# Patient Record
Sex: MALE | Race: WHITE | Employment: FULL TIME | URBAN - METROPOLITAN AREA
[De-identification: names, ages, dates, MRNs, and addresses within clinical notes are randomized per-mention and may not be internally consistent; named-entity substitution may affect disease eponyms.]

---

## 2017-02-09 NOTE — PROGRESS NOTES
HPI:    Patient ID: Yousuf Yost is a 29year old male. HPI  Patient is here for follow-up of depression and ADD. He is doing well and has complaint of right knee pain off and on for the past month. No trauma.   Review of Systems  Except for the ab abdomen is soft, non-tender, non-distended, no hepatosplenomegaly, no abnormal aortic pulsation. PSYCH: Mood and affect are normal.  Judgement and insight are intact. No suicidal thoughts.    MS: There is mild tenderness on the collateral ligaments of the

## 2017-05-18 ENCOUNTER — TELEPHONE (OUTPATIENT)
Dept: FAMILY MEDICINE CLINIC | Facility: CLINIC | Age: 37
End: 2017-05-18

## 2017-06-10 NOTE — PROGRESS NOTES
HPI:    Patient ID: Hayley Zavaleta is a 29year old male. HPI  Patient is here for follow-up of depression and ADD. He also complains of having knee pain on the right side which persist off and on.   Dr. Tho Fitzpatrick, GI, esophageal dilatation, [facility]  Read Christa palpation, no wheezing, no rhonchi, no rales, air entry is adequate, no accessory respiratory muscle use, no chest asymmetry, normal excursion.   GI:  bowel sound positive in all four quadrants, abdomen is soft, non-tender, non-distended, no hepatosplenomeg

## 2017-06-12 ENCOUNTER — TELEPHONE (OUTPATIENT)
Dept: FAMILY MEDICINE CLINIC | Facility: CLINIC | Age: 37
End: 2017-06-12

## 2017-10-26 ENCOUNTER — OFFICE VISIT (OUTPATIENT)
Dept: FAMILY MEDICINE CLINIC | Facility: CLINIC | Age: 37
End: 2017-10-26

## 2017-10-26 VITALS
SYSTOLIC BLOOD PRESSURE: 120 MMHG | RESPIRATION RATE: 12 BRPM | HEART RATE: 96 BPM | OXYGEN SATURATION: 100 % | HEIGHT: 71 IN | TEMPERATURE: 98 F | WEIGHT: 232 LBS | BODY MASS INDEX: 32.48 KG/M2 | DIASTOLIC BLOOD PRESSURE: 78 MMHG

## 2017-10-26 DIAGNOSIS — E55.9 VITAMIN D DEFICIENCY: ICD-10-CM

## 2017-10-26 DIAGNOSIS — Z00.00 ROUTINE ADULT HEALTH MAINTENANCE: Primary | ICD-10-CM

## 2017-10-26 DIAGNOSIS — Z13.31 NEGATIVE DEPRESSION SCREENING: ICD-10-CM

## 2017-10-26 PROCEDURE — 90686 IIV4 VACC NO PRSV 0.5 ML IM: CPT | Performed by: FAMILY MEDICINE

## 2017-10-26 PROCEDURE — 99395 PREV VISIT EST AGE 18-39: CPT | Performed by: FAMILY MEDICINE

## 2017-10-26 PROCEDURE — 90471 IMMUNIZATION ADMIN: CPT | Performed by: FAMILY MEDICINE
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
## 2017-10-27 ENCOUNTER — MED REC SCAN ONLY (OUTPATIENT)
Dept: FAMILY MEDICINE CLINIC | Facility: CLINIC | Age: 37
End: 2017-10-27

## 2017-12-06 RX ORDER — SERTRALINE HYDROCHLORIDE 100 MG/1
TABLET, FILM COATED ORAL
Qty: 180 TABLET | Refills: 0 | Status: SHIPPED | OUTPATIENT
Start: 2017-12-06 | End: 2018-02-11

## 2017-12-06 NOTE — TELEPHONE ENCOUNTER
Disp Refills Start End    SERTRALINE  MG Oral Tab 180 tablet 0 12/6/2017     Sig: TAKE ONE TABLET BY MOUTH TWICE DAILY     E-Prescribing Status: Receipt confirmed by pharmacy (12/6/2017 11:12 AM CST)

## 2017-12-06 NOTE — TELEPHONE ENCOUNTER
Requested Medications   Sertraline HCl Oral Tablet 100 MG  Will file in chart as: SERTRALINE  MG Oral Tab  The source prescription was discontinued on 10/26/2017 by Mariza Nichols, [address] [place] [address] Ave.   TAKE ONE TABLET BY MOUTH TWICE DAILY        Disp: 180 tablet

## 2017-12-21 ENCOUNTER — TELEPHONE (OUTPATIENT)
Dept: FAMILY MEDICINE CLINIC | Facility: CLINIC | Age: 37
End: 2017-12-21

## 2017-12-21 DIAGNOSIS — Z12.5 SCREENING FOR PROSTATE CANCER: Primary | ICD-10-CM

## 2017-12-21 NOTE — TELEPHONE ENCOUNTER
Dr Ole Murcia is calling requesting for PSA lab to be added to his lab orders and would like a call back when this is ordered.  Patient would like to have all his labs drawn at once    Lab order pended

## 2017-12-27 RX ORDER — ERGOCALCIFEROL 1.25 MG/1
50000 CAPSULE ORAL WEEKLY
Qty: 12 CAPSULE | Refills: 0 | Status: SHIPPED | OUTPATIENT
Start: 2017-12-27 | End: 2018-01-26

## 2017-12-27 NOTE — PROGRESS NOTES
Platelet count is low, looks like this was a problem in the past and pt has seen Dr. Eileen Zamorano, recommend a follow-up with Dr. Susu Hall, begin 50,000 U weekly for 12 weeks, and then OTC 2,000 daily  Elevated triglycerides, pt should work on die

## 2018-02-12 RX ORDER — SERTRALINE HYDROCHLORIDE 100 MG/1
TABLET, FILM COATED ORAL
Qty: 180 TABLET | Refills: 0 | Status: SHIPPED | OUTPATIENT
Start: 2018-02-12 | End: 2018-04-06

## 2018-02-12 NOTE — TELEPHONE ENCOUNTER
Requested Medications   Sertraline HCl Oral Tablet 100 MG  Will file in chart as: SERTRALINE  MG Oral Tab  TAKE ONE TABLET BY MOUTH TWICE DAILY        Disp: 180 tablet (Pharmacy requested 180)   Refills: 0     Class: Normal Start: 2/11/2018   Origin

## 2018-02-26 NOTE — TELEPHONE ENCOUNTER
Requesting Vyvanse 30 mg  LOV: 10/26/17  RTC: 6 mos  Last Labs: n/a  Filled: 12/25/17 #30 with 0 refills    No future appointments.     Med Last discussed: 10/26/17    Dispensed Written Strength Form Quantity Refills Days Supply Provider Pharmacy    [facility].

## 2018-02-27 NOTE — TELEPHONE ENCOUNTER
Pt called requesting brand name Vyvanse and not generic lisdexamfetamine. New script printed and awaiting Dr. Beka Liriano. Pt reported he will pick it up in a \"couple of days. \"  Requesting Vyvanse  LOV: 10/26/17  RTC: 6 months  Last Relevant Labs: n/a  F

## 2018-02-28 NOTE — TELEPHONE ENCOUNTER
I called patient to clarify as the new prescription was placed as generic also. I asked him why he wanted brand since he always gets generic - he said he thought he always got brand. I informed him he does not.   If he wants brand it will likely need prior

## 2018-04-06 ENCOUNTER — OFFICE VISIT (OUTPATIENT)
Dept: FAMILY MEDICINE CLINIC | Facility: CLINIC | Age: 38
End: 2018-04-06

## 2018-04-06 VITALS
TEMPERATURE: 98 F | RESPIRATION RATE: 16 BRPM | DIASTOLIC BLOOD PRESSURE: 72 MMHG | BODY MASS INDEX: 32.9 KG/M2 | SYSTOLIC BLOOD PRESSURE: 122 MMHG | HEIGHT: 71 IN | HEART RATE: 90 BPM | WEIGHT: 235 LBS | OXYGEN SATURATION: 99 %

## 2018-04-06 DIAGNOSIS — F98.8 ATTENTION DEFICIT DISORDER (ADD) WITHOUT HYPERACTIVITY: ICD-10-CM

## 2018-04-06 DIAGNOSIS — K62.5 RECTAL BLEEDING: Primary | ICD-10-CM

## 2018-04-06 PROCEDURE — 99214 OFFICE O/P EST MOD 30 MIN: CPT | Performed by: FAMILY MEDICINE

## 2018-04-09 NOTE — PROGRESS NOTES
HPI:    Patient ID: Velia Maza is a 40year old male. HPI  Patient presents with:  Medication Request: Vyvanse  Dizziness: with bodyaches and chills.  started last night and nauseous feeling, took zofran  Hemorrhoids: internal hemorrhoids, pt went auscultation, no chest tenderness to palpation, no wheezing, no rhonchi, no rales, air entry is adequate, no accessory respiratory muscle use, no chest asymmetry, normal excursion.   GI:  bowel sound positive in all four quadrants, abdomen is soft, non-tend

## 2018-04-30 RX ORDER — SERTRALINE HYDROCHLORIDE 100 MG/1
TABLET, FILM COATED ORAL
Qty: 180 TABLET | Refills: 0 | Status: SHIPPED | OUTPATIENT
Start: 2018-04-30 | End: 2018-09-30

## 2018-05-11 ENCOUNTER — OFFICE VISIT (OUTPATIENT)
Dept: HEMATOLOGY/ONCOLOGY | Facility: HOSPITAL | Age: 38
End: 2018-05-11
Attending: INTERNAL MEDICINE
Payer: COMMERCIAL

## 2018-05-11 VITALS
BODY MASS INDEX: 34.22 KG/M2 | HEIGHT: 70 IN | SYSTOLIC BLOOD PRESSURE: 131 MMHG | RESPIRATION RATE: 18 BRPM | DIASTOLIC BLOOD PRESSURE: 92 MMHG | TEMPERATURE: 97 F | HEART RATE: 84 BPM | WEIGHT: 239 LBS

## 2018-05-11 DIAGNOSIS — R16.1 SPLENOMEGALY: ICD-10-CM

## 2018-05-11 DIAGNOSIS — D68.0 VON WILLEBRAND DISEASE (HCC): ICD-10-CM

## 2018-05-11 DIAGNOSIS — D69.6 THROMBOCYTOPENIA (HCC): Primary | ICD-10-CM

## 2018-05-11 PROCEDURE — 99245 OFF/OP CONSLTJ NEW/EST HI 55: CPT | Performed by: INTERNAL MEDICINE

## 2018-05-16 NOTE — CONSULTS
[facility] Report of Consultation    Patient Name: Coar Roberts   YOB: 1980   Medical Record Number: [id number]   CSN: [id number]   Consulting Physician: Adriana Sepulveda M.D.    Referring Physician: Roger Flores    Date of Consultat Seat Belt Yes     Social History Narrative   None on file       Current Medications:    Current Outpatient Prescriptions:   •  SERTRALINE  MG Oral Tab, TAKE ONE TABLET BY MOUTH TWICE DAILY , Disp: 180 tablet, Rfl: 0  •  Lisdexamfetamine Dimesylat inflammation, ulcerations or skin changes. Neurologic Normal - No headache, blurred vision, and no areas of focal weakness. Normal gait. Psychiatric Normal - No insomnia, depression, yonatan or mood swings.          Vital Signs:  /92 (BP Location: L 11/8/2013 12:30 10/9/2015 11:26 5/19/2017 09:39 12/26/2017 08:57 5/11/2018 14:53   WBC Latest Ref Range: 4.0 - 13.0 x10(3) uL 5.3 4.6   6.1   Hemoglobin Latest Ref Range: 13.0 - 17.0 g/dL 16.0 15.2 15.2 15.4 15.6   Hematocrit Latest Ref Range: 37.0 - 53.0 related to his Fatty liver. We discussed the fact that the platelet count has been low for some time without progression or the appearance of an other count abnormalities. This is a benign finding. Will repeat imaging of the spleen.   If the spleen is no

## 2018-07-16 RX ORDER — LISDEXAMFETAMINE DIMESYLATE 30 MG/1
CAPSULE ORAL
Qty: 30 CAPSULE | Refills: 0 | OUTPATIENT
Start: 2018-07-16

## 2018-07-16 NOTE — TELEPHONE ENCOUNTER
Requested Medications   Vyvanse Oral Capsule 30 MG  Will file in chart as: VYVANSE 30 MG Oral Cap  TAKE ONE CAPSULE BY MOUTH ONE TIME DAILY        Disp: 30 capsule (Pharmacy requested 30)   Refills: 0     Class: Normal Start: 7/15/2018   Documented:3 years

## 2018-10-03 RX ORDER — SERTRALINE HYDROCHLORIDE 100 MG/1
100 TABLET, FILM COATED ORAL 2 TIMES DAILY
Qty: 60 TABLET | Refills: 0 | Status: SHIPPED | OUTPATIENT
Start: 2018-10-03 | End: 2018-11-13

## 2018-11-02 ENCOUNTER — OFFICE VISIT (OUTPATIENT)
Dept: FAMILY MEDICINE CLINIC | Facility: CLINIC | Age: 38
End: 2018-11-02
Payer: COMMERCIAL

## 2018-11-02 VITALS
OXYGEN SATURATION: 100 % | BODY MASS INDEX: 34.07 KG/M2 | HEIGHT: 70 IN | DIASTOLIC BLOOD PRESSURE: 70 MMHG | RESPIRATION RATE: 12 BRPM | SYSTOLIC BLOOD PRESSURE: 120 MMHG | WEIGHT: 238 LBS | TEMPERATURE: 98 F | HEART RATE: 81 BPM

## 2018-11-02 DIAGNOSIS — F98.8 ATTENTION DEFICIT DISORDER (ADD) WITHOUT HYPERACTIVITY: ICD-10-CM

## 2018-11-02 DIAGNOSIS — R31.9 HEMATURIA, UNSPECIFIED TYPE: ICD-10-CM

## 2018-11-02 DIAGNOSIS — Z00.00 ROUTINE ADULT HEALTH MAINTENANCE: Primary | ICD-10-CM

## 2018-11-02 DIAGNOSIS — R73.9 ELEVATED BLOOD SUGAR: ICD-10-CM

## 2018-11-02 DIAGNOSIS — Z13.6 ISCHEMIC HEART DISEASE SCREEN: ICD-10-CM

## 2018-11-02 DIAGNOSIS — R53.83 FATIGUE, UNSPECIFIED TYPE: ICD-10-CM

## 2018-11-02 PROCEDURE — 99213 OFFICE O/P EST LOW 20 MIN: CPT | Performed by: FAMILY MEDICINE

## 2018-11-02 PROCEDURE — 99395 PREV VISIT EST AGE 18-39: CPT | Performed by: FAMILY MEDICINE

## 2018-11-02 RX ORDER — LOSARTAN POTASSIUM 50 MG/1
50 TABLET ORAL DAILY
Qty: 90 TABLET | Refills: 0 | Status: CANCELLED | OUTPATIENT
Start: 2018-11-02

## 2018-11-02 NOTE — PROGRESS NOTES
Shameka Garcia is a 41year old male who presents for a complete physical exam.   HPI:   Patient would also like to let me know that he has knee pain in the right side and has been trying to manage that with anti-inflammatories and stretching.   It has help mouth daily. Disp: 30 capsule Rfl: 0   [START ON 12/2/2018] Lisdexamfetamine Dimesylate (VYVANSE) 30 MG Oral Cap Take 1 capsule (30 mg total) by mouth daily.  Disp: 30 capsule Rfl: 0   [START ON 1/1/2019] Lisdexamfetamine Dimesylate (VYVANSE) 30 MG Oral Cap denies scrotal mass/abnormal discharge from urethra  MUSCULOSKELETAL: denies joint or muscle aches or pains  NEURO: denies headaches/dizziness  PSYCH: denies depression or anxiety  HEMATOLOGIC: denies easy bruising/bleeding/anemia/blood clot disorders  END Yrs+ Quad Prsv Free 0.5 ml (46380)                          10/21/2016  10/26/2017      ASSESSMENT AND PLAN:   Kristofer Gallardo is a 41year old male who presents for a complete physical exam.     David Parth was seen today for cpx.     Diagnoses and all orders f understanding of these recommendations and agrees to the plan. We will check testosterone level along with vitamin D and blood test regarding fatigue. We will also order urinalysis to see if blood is still there. If it is he may need further workup.

## 2018-11-13 RX ORDER — SERTRALINE HYDROCHLORIDE 100 MG/1
100 TABLET, FILM COATED ORAL 2 TIMES DAILY
Qty: 60 TABLET | Refills: 0 | Status: SHIPPED | OUTPATIENT
Start: 2018-11-13 | End: 2018-12-16

## 2018-12-18 RX ORDER — SERTRALINE HYDROCHLORIDE 100 MG/1
100 TABLET, FILM COATED ORAL 2 TIMES DAILY
Qty: 180 TABLET | Refills: 2 | Status: SHIPPED | OUTPATIENT
Start: 2018-12-18 | End: 2019-10-02

## 2019-01-14 NOTE — TELEPHONE ENCOUNTER
Requesting vyvanse 30 mg  LOV: 11/2/18  RTC: none specified   Last Labs: n/a  Filled: 1/1/19 #30 with 0 refills    No future appointments.   [place] Written Strength Quantity Refills Days Supply Provider Pharmacy    [facility] 11/25/2018 11/02/2018 3

## 2019-04-16 RX ORDER — LISDEXAMFETAMINE DIMESYLATE 30 MG/1
CAPSULE ORAL
Qty: 30 CAPSULE | Refills: 0 | Status: SHIPPED | OUTPATIENT
Start: 2019-04-16 | End: 2019-11-15

## 2019-04-16 RX ORDER — LISDEXAMFETAMINE DIMESYLATE 30 MG/1
CAPSULE ORAL
Qty: 30 CAPSULE | Refills: 0 | OUTPATIENT
Start: 2019-04-16

## 2019-04-16 NOTE — TELEPHONE ENCOUNTER
Faxed RX for Vyvanse signed per Dr. Tavo Duncan to [facility] [phone] with confirmation received.

## 2019-04-22 NOTE — TELEPHONE ENCOUNTER
Script for vyvanse cancelled. Patient unable to  until - script will . Patient scheduled his 6 month med follow up on Friday so that he can get a 3 month supply instead of 1.   Future Appointments   Date Time Provider Department Cent

## 2019-05-01 ENCOUNTER — MED REC SCAN ONLY (OUTPATIENT)
Dept: FAMILY MEDICINE CLINIC | Facility: CLINIC | Age: 39
End: 2019-05-01

## 2019-05-03 PROBLEM — E66.09 CLASS 2 OBESITY DUE TO EXCESS CALORIES WITHOUT SERIOUS COMORBIDITY WITH BODY MASS INDEX (BMI) OF 35.0 TO 35.9 IN ADULT: Status: ACTIVE | Noted: 2019-05-03

## 2019-05-03 PROBLEM — E55.9 VITAMIN D DEFICIENCY: Status: ACTIVE | Noted: 2019-05-03

## 2019-05-03 PROBLEM — E66.812 CLASS 2 OBESITY DUE TO EXCESS CALORIES WITHOUT SERIOUS COMORBIDITY WITH BODY MASS INDEX (BMI) OF 35.0 TO 35.9 IN ADULT: Status: ACTIVE | Noted: 2019-05-03

## 2019-05-03 NOTE — PROGRESS NOTES
HPI:   Gilberto Pena is a 41year old male that presents for medication refills    Patient is here for 3 month follow up on vyvanse 30mg    Past medical, surgical, family and social history reviewed in detail with patient. Updated where appropriate. Height as of this encounter: 70\". Weight as of this encounter: 247 lb. Vital signs reviewed. Appears stated age, well groomed. Physical Exam:  GEN:  Patient is alert, awake and oriented, well developed, well nourished, no apparent distress.   HEENT: file.    Shana Ardon M.D.   [facility]   5/3/2019  9:49 AM

## 2019-08-21 ENCOUNTER — TELEPHONE (OUTPATIENT)
Dept: FAMILY MEDICINE CLINIC | Facility: CLINIC | Age: 39
End: 2019-08-21

## 2019-08-21 NOTE — TELEPHONE ENCOUNTER
Pt is requesting 3 month refill Vyvanse 30mg    [place] , [place] 7/14/19 #30      Last OV 5/3/19  1. Attention deficit disorder (ADD) without hyperactivity  Will refill Vyvanse counseled at length regarding ADD. No side effects.    He may follow-u

## 2019-08-21 NOTE — TELEPHONE ENCOUNTER
- Pt would like to request refill for   VYVANSE 30 MG Oral Cap 30 capsule 0 4/16/2019     Sig: TAKE ONE CAPSULE (30 MG TOTAL) BY MOUTH ONE TIME DAILY     Earliest Fill Date: 4/16/2019      Please advise if patient needs an appt for refills?     Ple

## 2019-10-02 RX ORDER — SERTRALINE HYDROCHLORIDE 100 MG/1
100 TABLET, FILM COATED ORAL 2 TIMES DAILY
Qty: 60 TABLET | Refills: 1 | Status: SHIPPED | OUTPATIENT
Start: 2019-10-02 | End: 2019-11-22

## 2019-10-02 NOTE — TELEPHONE ENCOUNTER
Requesting Sertraline 100mg bid  LOV: 5/3/19  RTC: 6 months  Last Relevant Labs: annual labs done 11/23/18  Filled: 12/18/18 #180 with 2 refills    No future appointments.     Non protocol med pended and routed to MD for approval/denial

## 2019-11-15 ENCOUNTER — OFFICE VISIT (OUTPATIENT)
Dept: FAMILY MEDICINE CLINIC | Facility: CLINIC | Age: 39
End: 2019-11-15
Payer: COMMERCIAL

## 2019-11-15 VITALS
WEIGHT: 246 LBS | SYSTOLIC BLOOD PRESSURE: 121 MMHG | HEIGHT: 70 IN | BODY MASS INDEX: 35.22 KG/M2 | RESPIRATION RATE: 16 BRPM | DIASTOLIC BLOOD PRESSURE: 80 MMHG | TEMPERATURE: 98 F | HEART RATE: 75 BPM

## 2019-11-15 DIAGNOSIS — E78.00 HIGH CHOLESTEROL: ICD-10-CM

## 2019-11-15 DIAGNOSIS — F98.8 ATTENTION DEFICIT DISORDER (ADD) WITHOUT HYPERACTIVITY: ICD-10-CM

## 2019-11-15 DIAGNOSIS — Z00.00 ROUTINE GENERAL MEDICAL EXAMINATION AT A HEALTH CARE FACILITY: Primary | ICD-10-CM

## 2019-11-15 PROCEDURE — 99395 PREV VISIT EST AGE 18-39: CPT | Performed by: FAMILY MEDICINE

## 2019-11-15 RX ORDER — FLUTICASONE PROPIONATE 220 UG/1
AEROSOL, METERED RESPIRATORY (INHALATION)
Refills: 1 | COMMUNITY
Start: 2019-09-22 | End: 2022-01-16
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
## 2019-11-26 RX ORDER — SERTRALINE HYDROCHLORIDE 100 MG/1
100 TABLET, FILM COATED ORAL 2 TIMES DAILY
Qty: 60 TABLET | Refills: 5 | Status: SHIPPED | OUTPATIENT
Start: 2019-11-26 | End: 2020-05-22

## 2019-12-04 RX ORDER — LISDEXAMFETAMINE DIMESYLATE CAPSULES 30 MG/1
30 CAPSULE ORAL DAILY
Qty: 30 CAPSULE | Refills: 0 | Status: SHIPPED | OUTPATIENT
Start: 2019-12-04 | End: 2020-01-03

## 2019-12-04 RX ORDER — LISDEXAMFETAMINE DIMESYLATE 30 MG/1
CAPSULE ORAL
Qty: 30 CAPSULE | Refills: 0
Start: 2019-12-04

## 2019-12-04 RX ORDER — LISDEXAMFETAMINE DIMESYLATE CAPSULES 30 MG/1
30 CAPSULE ORAL DAILY
Qty: 30 CAPSULE | Refills: 0 | Status: SHIPPED | OUTPATIENT
Start: 2020-01-04 | End: 2020-02-03

## 2019-12-04 RX ORDER — LISDEXAMFETAMINE DIMESYLATE CAPSULES 30 MG/1
30 CAPSULE ORAL DAILY
Qty: 30 CAPSULE | Refills: 0 | Status: SHIPPED | OUTPATIENT
Start: 2020-02-04 | End: 2020-03-05

## 2019-12-04 NOTE — TELEPHONE ENCOUNTER
Patient is requesting a refill on Vyvanse 30mg #30  Was here for ADHD  In May 2019, was to return in 6 months  Was here in Nov but nothing discussed about ADHD  Med pended

## 2020-01-04 NOTE — TELEPHONE ENCOUNTER
Requesting Sertraline  LOV: 4/6/18  RTC: 6 months  Last Relevant Labs: n/a  Filled: 4/30/18 #180 with 0 refills    No future appointments. Pt is due for office visit.  One month sent to pharmacy and my chart sent to schedule Never smoker

## 2020-03-10 RX ORDER — LISDEXAMFETAMINE DIMESYLATE CAPSULES 30 MG/1
30 CAPSULE ORAL DAILY
Qty: 30 CAPSULE | Refills: 0 | Status: SHIPPED | OUTPATIENT
Start: 2020-04-10 | End: 2020-06-22

## 2020-03-10 RX ORDER — LISDEXAMFETAMINE DIMESYLATE CAPSULES 30 MG/1
30 CAPSULE ORAL DAILY
Qty: 30 CAPSULE | Refills: 0 | Status: SHIPPED | OUTPATIENT
Start: 2020-05-11 | End: 2020-06-10

## 2020-03-10 RX ORDER — LISDEXAMFETAMINE DIMESYLATE 30 MG/1
CAPSULE ORAL
Qty: 30 CAPSULE | Refills: 0 | OUTPATIENT
Start: 2020-03-10

## 2020-03-10 RX ORDER — LISDEXAMFETAMINE DIMESYLATE CAPSULES 30 MG/1
30 CAPSULE ORAL DAILY
Qty: 30 CAPSULE | Refills: 0 | Status: SHIPPED | OUTPATIENT
Start: 2020-03-10 | End: 2020-04-09

## 2020-03-10 NOTE — TELEPHONE ENCOUNTER
Requesting Vyvanse 30mg  LOV: 11/15/19 Physical  RTC: not specified  Last Relevant Labs: 11/23/18  Filled: 2/4/2020 #30 with 0 refills    No future appointments.     Rx pended and routed to MD for approval/denial

## 2020-05-22 RX ORDER — SERTRALINE HYDROCHLORIDE 100 MG/1
100 TABLET, FILM COATED ORAL 2 TIMES DAILY
Qty: 180 TABLET | Refills: 0 | Status: SHIPPED | OUTPATIENT
Start: 2020-05-22 | End: 2020-08-13

## 2020-05-22 NOTE — TELEPHONE ENCOUNTER
Requested Prescriptions     Pending Prescriptions Disp Refills   • SERTRALINE  MG Oral Tab [Pharmacy Med Name: Sertraline Hydrochloride 100 Mg Tab Nort] 60 tablet 0     Sig: Take 1 tablet (100 mg total) by mouth 2 (two) times daily.        LOV: 11/15

## 2020-06-22 RX ORDER — LISDEXAMFETAMINE DIMESYLATE 30 MG/1
CAPSULE ORAL
Qty: 30 CAPSULE | Refills: 0 | Status: SHIPPED | OUTPATIENT
Start: 2020-06-22 | End: 2020-08-03

## 2020-06-22 NOTE — TELEPHONE ENCOUNTER
Requesting Vyvanse 30mg  LOV: 11/23/19 Physical  RTC: prn  Last Relevant Labs: annual labs done 11/23/18  Filled: 5/11/2020 #30 with 0 refills    No future appointments. Rx pended and routed for approval/denial. Do you want pt to schedule follow up?

## 2020-08-03 RX ORDER — LISDEXAMFETAMINE DIMESYLATE 30 MG/1
CAPSULE ORAL
Qty: 30 CAPSULE | Refills: 0 | Status: SHIPPED | OUTPATIENT
Start: 2020-08-03 | End: 2020-09-21

## 2020-08-03 NOTE — TELEPHONE ENCOUNTER
Name from pharmacy: [facility] [address]          Will file in chart as: VYVANSE 30 MG Oral Cap         Sig: TAKE ONE CAPSULE BY MOUTH ONE TIME DAILY     Disp:  30 capsule    Refills:  0    Start: 8/2/2020    Earliest Fill Date: 8/2/2020    Class: Normal

## 2020-08-12 NOTE — TELEPHONE ENCOUNTER
Requested Prescriptions     Pending Prescriptions Disp Refills   • SERTRALINE  MG Oral Tab [Pharmacy Med Name: Sertraline Hydrochloride 100 Mg Tab Nort] 180 tablet 0     Sig: Take 1 tablet (100 mg total) by mouth 2 (two) times daily.        LOV: 11/1

## 2020-08-13 RX ORDER — SERTRALINE HYDROCHLORIDE 100 MG/1
100 TABLET, FILM COATED ORAL 2 TIMES DAILY
Qty: 180 TABLET | Refills: 0 | Status: SHIPPED | OUTPATIENT
Start: 2020-08-13 | End: 2020-11-06

## 2020-09-21 ENCOUNTER — PATIENT MESSAGE (OUTPATIENT)
Dept: FAMILY MEDICINE CLINIC | Facility: CLINIC | Age: 40
End: 2020-09-21

## 2020-09-21 RX ORDER — LISDEXAMFETAMINE DIMESYLATE 30 MG/1
CAPSULE ORAL
Qty: 30 CAPSULE | Refills: 0 | OUTPATIENT
Start: 2020-09-21

## 2020-09-21 NOTE — TELEPHONE ENCOUNTER
Name from pharmacy: [facility]          Will file in chart as: VYVANSE 30 MG Oral Cap    The original prescription was reordered on 9/21/2020 by Melisa Armenta MD. Renewing this prescription may not be appropriate.      Possible duplicate: Cash

## 2020-09-21 NOTE — TELEPHONE ENCOUNTER
Received a fax from [facility] requesting a refill for Rx Vyvanse #30   Last OV:11/15/2019  No protocol medication   Last time medication was refilled:8/3/20#30 with no refill   Medication routed to PCP for refill if appropriate.    No future appointment

## 2020-09-22 NOTE — TELEPHONE ENCOUNTER
Spoke with pharmacy and prescription was received yesterday, will be filled and available for  today.

## 2020-09-22 NOTE — TELEPHONE ENCOUNTER
From: Mahendra Perales  To: June Basilio MD  Sent: 9/21/2020 5:32 PM CDT  Subject: Prescription Question    Sorry, for the confusion. Was the Vyvanse approved or denied?

## 2020-10-29 NOTE — TELEPHONE ENCOUNTER
Disp Refills Start End    Lisdexamfetamine Dimesylate (VYVANSE) 30 MG Oral Cap 30 capsule 0 9/21/2020       Last OV 11/15/19 for physical   Future Appointments   Date Time Provider Alli Felipe   11/16/2020  9:30 AM Mariza Simmons MD [facility]

## 2020-10-29 NOTE — TELEPHONE ENCOUNTER
Future Appointments   Date Time Provider Alli Felipe   11/16/2020  9:30 AM Geoffrey Solano MD [facility] [address]     Pt is asking for labs to be put in system prior to his appt.     Please let pt know when he can schedule.  _________________________

## 2020-11-06 RX ORDER — SERTRALINE HYDROCHLORIDE 100 MG/1
100 TABLET, FILM COATED ORAL 2 TIMES DAILY
Qty: 180 TABLET | Refills: 0 | Status: SHIPPED | OUTPATIENT
Start: 2020-11-06 | End: 2021-02-01

## 2020-11-06 NOTE — TELEPHONE ENCOUNTER
Name from pharmacy: Sertraline Hydrochloride 100 Mg Tab Nort          Will file in chart as: SERTRALINE  MG Oral Tab    Sig: Take 1 tablet (100 mg total) by mouth 2 (two) times daily.     Disp:  180 tablet    Refills:  0    Start: 11/6/2020    Class:

## 2020-12-09 DIAGNOSIS — E55.9 VITAMIN D DEFICIENCY: Primary | ICD-10-CM

## 2020-12-09 DIAGNOSIS — Z00.00 LABORATORY EXAMINATION ORDERED AS PART OF A ROUTINE GENERAL MEDICAL EXAMINATION: ICD-10-CM

## 2020-12-09 NOTE — TELEPHONE ENCOUNTER
Future Appointments   Date Time Provider Alli Felipe   12/21/2020  4:00 PM Nancy Murillo MD [facility] [address]     Requesting a refill on Vyvanse 30mg. Last physical done 11/2019. He has an upcoming appt 12/21/2020.   Recent labs done 11/23/18

## 2020-12-21 ENCOUNTER — OFFICE VISIT (OUTPATIENT)
Dept: FAMILY MEDICINE CLINIC | Facility: CLINIC | Age: 40
End: 2020-12-21
Payer: COMMERCIAL

## 2020-12-21 VITALS
DIASTOLIC BLOOD PRESSURE: 94 MMHG | TEMPERATURE: 97 F | HEIGHT: 70.5 IN | SYSTOLIC BLOOD PRESSURE: 128 MMHG | HEART RATE: 104 BPM | OXYGEN SATURATION: 98 % | WEIGHT: 247 LBS | RESPIRATION RATE: 16 BRPM | BODY MASS INDEX: 34.97 KG/M2

## 2020-12-21 DIAGNOSIS — F33.9 RECURRENT MAJOR DEPRESSIVE DISORDER, REMISSION STATUS UNSPECIFIED (HCC): ICD-10-CM

## 2020-12-21 DIAGNOSIS — Z00.00 ROUTINE ADULT HEALTH MAINTENANCE: Primary | ICD-10-CM

## 2020-12-21 DIAGNOSIS — F98.8 ATTENTION DEFICIT DISORDER (ADD) WITHOUT HYPERACTIVITY: ICD-10-CM

## 2020-12-21 DIAGNOSIS — R29.898 WEAKNESS OF BOTH LOWER EXTREMITIES: ICD-10-CM

## 2020-12-21 DIAGNOSIS — S81.011A LACERATION OF RIGHT KNEE, INITIAL ENCOUNTER: ICD-10-CM
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
## 2020-12-21 DIAGNOSIS — M25.572 LEFT ANKLE PAIN, UNSPECIFIED CHRONICITY: ICD-10-CM

## 2020-12-21 PROCEDURE — 3008F BODY MASS INDEX DOCD: CPT | Performed by: FAMILY MEDICINE

## 2020-12-21 PROCEDURE — 99395 PREV VISIT EST AGE 18-39: CPT | Performed by: FAMILY MEDICINE

## 2020-12-21 PROCEDURE — 99214 OFFICE O/P EST MOD 30 MIN: CPT | Performed by: FAMILY MEDICINE

## 2020-12-21 PROCEDURE — 3080F DIAST BP >= 90 MM HG: CPT | Performed by: FAMILY MEDICINE

## 2020-12-21 PROCEDURE — 3074F SYST BP LT 130 MM HG: CPT | Performed by: FAMILY MEDICINE

## 2020-12-21 RX ORDER — PANTOPRAZOLE SODIUM 40 MG/1
1 TABLET, DELAYED RELEASE ORAL 2 TIMES DAILY
COMMUNITY
Start: 2020-11-26

## 2020-12-21 NOTE — PROGRESS NOTES
Cora Roberts is a 45year old male who presents for a complete physical exam.   HPI:   Pt complains of nothing. April 2020, went to urinal in bathroom, legs became numb and fell and lacerated right knee, hurt left foot/ankle. He denies having LOC.  No LIPID PANEL   Result Value Ref Range    CHOLESTEROL, TOTAL 126 <200 mg/dL    HDL CHOLESTEROL 26 (L) >40 mg/dL    TRIGLYCERIDES 161 (H) <150 mg/dL    LDL-CHOLESTEROL 73 mg/dL (calc)    CHOL/HDLC RATIO 4.8 <5.0 (calc)    NON-HDL CHOLESTEROL 100 <130 mg/dL (MULTI-VITAMIN DAILY OR) Take  by mouth.      • FLOVENT  MCG/ACT Inhalation Aerosol   1      Past Medical History:   Diagnosis Date   • Anxiety state, unspecified    • Depression    • Von Willebrand disease, type IIa ([facility]       Past Surgical History moles or lesions  HENT: NCAT, EACs clear b/l, TMs normal b/l, nasal turbinatess normal b/l, oropharynx with mmm/clear/normal, gingiva normal, lips normal  EYES: PERRLA, EOMI, conjunctiva non-injected and non-icteric  NECK: supple, no adenopathy/thyromegaly FREE    Weakness of both lower extremities    Fall, initial encounter    Laceration of right knee, initial encounter    Left ankle pain, unspecified chronicity    Attention deficit disorder (ADD) without hyperactivity    Recurrent major depressive disorder

## 2020-12-24 PROBLEM — S81.011A LACERATION OF RIGHT KNEE: Status: ACTIVE | Noted: 2020-12-24

## 2020-12-24 PROBLEM — R29.898 WEAKNESS OF BOTH LOWER EXTREMITIES: Status: ACTIVE | Noted: 2020-12-24
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
## 2020-12-24 PROBLEM — M25.572 LEFT ANKLE PAIN: Status: ACTIVE | Noted: 2020-12-24

## 2021-01-05 DIAGNOSIS — E78.2 ELEVATED TRIGLYCERIDES WITH HIGH CHOLESTEROL: Primary | ICD-10-CM

## 2021-01-05 LAB
ABSOLUTE BASOPHILS: 72 CELLS/UL (ref 0–200)
ABSOLUTE EOSINOPHILS: 545 CELLS/UL (ref 15–500)
ABSOLUTE LYMPHOCYTES: 1381 CELLS/UL (ref 850–3900)
ABSOLUTE MONOCYTES: 484 CELLS/UL (ref 200–950)
ABSOLUTE NEUTROPHILS: 3020 CELLS/UL (ref 1500–7800)
ALBUMIN/GLOBULIN RATIO: 1.8 (CALC) (ref 1–2.5)
ALBUMIN: 4.6 G/DL (ref 3.6–5.1)
ALKALINE PHOSPHATASE: 47 U/L (ref 36–130)
ALT: 45 U/L (ref 9–46)
AST: 33 U/L (ref 10–40)
BASOPHILS: 1.3 %
BILIRUBIN, TOTAL: 1.3 MG/DL (ref 0.2–1.2)
BUN: 13 MG/DL (ref 7–25)
CALCIUM: 9.5 MG/DL (ref 8.6–10.3)
CARBON DIOXIDE: 30 MMOL/L (ref 20–32)
CHLORIDE: 102 MMOL/L (ref 98–110)
CHOL/HDLC RATIO: 5.5 (CALC)
CHOLESTEROL, TOTAL: 153 MG/DL
CREATININE: 1.21 MG/DL (ref 0.6–1.35)
EGFR IF AFRICN AM: 87 ML/MIN/1.73M2
EGFR IF NONAFRICN AM: 75 ML/MIN/1.73M2
EOSINOPHILS: 9.9 %
GLOBULIN: 2.5 G/DL (CALC) (ref 1.9–3.7)
GLUCOSE: 89 MG/DL (ref 65–99)
HDL CHOLESTEROL: 28 MG/DL
HEMATOCRIT: 47.1 % (ref 38.5–50)
HEMOGLOBIN: 16.4 G/DL (ref 13.2–17.1)
LDL-CHOLESTEROL: 93 MG/DL (CALC)
LYMPHOCYTES: 25.1 %
MCH: 29.7 PG (ref 27–33)
MCHC: 34.8 G/DL (ref 32–36)
MCV: 85.2 FL (ref 80–100)
MONOCYTES: 8.8 %
MPV: 11.8 FL (ref 7.5–12.5)
NEUTROPHILS: 54.9 %
NON-HDL CHOLESTEROL: 125 MG/DL (CALC)
PLATELET COUNT: 124 THOUSAND/UL (ref 140–400)
POTASSIUM: 4.1 MMOL/L (ref 3.5–5.3)
PROTEIN, TOTAL: 7.1 G/DL (ref 6.1–8.1)
RDW: 13.5 % (ref 11–15)
RED BLOOD CELL COUNT: 5.53 MILLION/UL (ref 4.2–5.8)
SODIUM: 140 MMOL/L (ref 135–146)
TOTAL PSA: 0.4 NG/ML
TRIGLYCERIDES: 232 MG/DL
TSH W/REFLEX TO FT4: 2.31 MIU/L (ref 0.4–4.5)
VITAMIN D, 25-OH, TOTAL: 41 NG/ML (ref 30–100)
WHITE BLOOD CELL COUNT: 5.5 THOUSAND/UL (ref 3.8–10.8)

## 2021-01-11 NOTE — TELEPHONE ENCOUNTER
Requesting VYVANSE 30 MG   LOV: 12/21/20  RTC:  Last Relevant Labs: 1/4/21  Filled: 12/9/20 # 30 with 0 refill    No future appointments.     VYVANSE 12/09/2020 12/09/2020 30 30 capsule  30 STELLA, [address] [facility] [address]

## 2021-01-14 RX ORDER — LISDEXAMFETAMINE DIMESYLATE 30 MG/1
CAPSULE ORAL
Qty: 30 CAPSULE | Refills: 0 | OUTPATIENT
Start: 2021-01-14

## 2021-01-29 NOTE — TELEPHONE ENCOUNTER
Name from pharmacy: Sertraline Hydrochloride 100 Mg Tab Nort          Will file in chart as: SERTRALINE  MG Oral Tab    Sig: Take 1 tablet (100 mg total) by mouth 2 (two) times daily.     Disp:  180 tablet    Refills:  0    Start: 1/29/2021    Class:

## 2021-02-01 RX ORDER — SERTRALINE HYDROCHLORIDE 100 MG/1
100 TABLET, FILM COATED ORAL 2 TIMES DAILY
Qty: 180 TABLET | Refills: 1 | Status: SHIPPED | OUTPATIENT
Start: 2021-02-01 | End: 2021-07-23

## 2021-02-16 ENCOUNTER — TELEPHONE (OUTPATIENT)
Dept: FAMILY MEDICINE CLINIC | Facility: CLINIC | Age: 41
End: 2021-02-16

## 2021-02-16 NOTE — TELEPHONE ENCOUNTER
Recd request from [facility] for medical records from 1-1-2020 to present for an upcoming appt. Printed OV note from 12- and labs from 1-4-2021 to be reviewed and signed by physician.  Once complete, will fax records to Te

## 2021-04-09 DIAGNOSIS — Z23 NEED FOR VACCINATION: ICD-10-CM

## 2021-04-26 NOTE — TELEPHONE ENCOUNTER
Requesting: Lisdexamfetamine Dimesylate (VYVANSE) 30 MG   LOV: 12/21/2020 w/Dr. Estrada Re for annual  RTC: prn  Last Relevant Labs: 01/04/2021  Filled: 12/9/2020 #30 with 0 refills    No future appointments.     Medication pended and routed to PCP for approval

## 2021-05-01 ENCOUNTER — TELEPHONE (OUTPATIENT)
Dept: FAMILY MEDICINE CLINIC | Facility: CLINIC | Age: 41
End: 2021-05-01

## 2021-05-07 ENCOUNTER — TELEPHONE (OUTPATIENT)
Dept: FAMILY MEDICINE CLINIC | Facility: CLINIC | Age: 41
End: 2021-05-07

## 2021-05-07 NOTE — TELEPHONE ENCOUNTER
Spoke with Rianna Quiros, states they are waiting on MD approval for Vyanse. [place] 30mg was [place] on 4/26/21 #30.  Pharmacist checked the  and confirmed, they will cancel request.

## 2021-05-28 ENCOUNTER — TELEPHONE (OUTPATIENT)
Dept: FAMILY MEDICINE CLINIC | Facility: CLINIC | Age: 41
End: 2021-05-28

## 2021-05-28 NOTE — TELEPHONE ENCOUNTER
Guera Santoyo was seen at the ER on 03/22/2021 at [facility].  Patient signed release of information and needs to be faxed to medical records department @ [phone]    Paperwork to fax basket

## 2021-05-28 NOTE — PROGRESS NOTES
HPI:   Mahendra Perales is a 45year old male that presents for Patient presents with:  Medication Follow-Up: f/u ADHD medication   Acid Base Problem: acid reflux 03/22/2021 another dilation, dx w/acid reflux-bad reaction-\"felt like i had swallowed shards o 180 tablet, Rfl: 1  •  Pantoprazole Sodium 40 MG Oral Tab EC, Take 1 tablet by mouth 2 (two) times a day.  Take 1 @ Lunch time, Take 1 @ dinner , Disp: , Rfl:   •  Lisdexamfetamine Dimesylate (VYVANSE) 30 MG Oral Cap, Take 1 capsule (30 mg total) by mouth d masses, no hepatosplenomegaly. EXTREMITIES:  No edema, no cyanosis, 2+ radial pulses b/l. NEURO:  Grossly normal     ASSESSMENT AND PLAN:      1. Attention deficit disorder (ADD) without hyperactivity    2.  Aortic dilatation (HCC)  - CARDIO - INTERNAL

## 2021-05-29 NOTE — TELEPHONE ENCOUNTER
- Pt signed release to disclose information from Makenna COTTO [facility]. to be sent to you.   Faxed to [phone] and sent to scan stat with bar code./ts

## 2021-06-01 PROBLEM — R06.83 SNORING: Status: ACTIVE | Noted: 2021-06-01

## 2021-06-01 PROBLEM — I77.819 AORTIC DILATATION (HCC): Status: ACTIVE | Noted: 2021-06-01

## 2021-06-01 PROBLEM — R40.0 DAYTIME SOMNOLENCE: Status: ACTIVE | Noted: 2021-06-01

## 2021-06-01 PROBLEM — I77.819 AORTIC DILATATION: Status: ACTIVE | Noted: 2021-06-01

## 2021-06-01 PROBLEM — K21.00 GASTROESOPHAGEAL REFLUX DISEASE WITH ESOPHAGITIS WITHOUT HEMORRHAGE: Status: ACTIVE | Noted: 2021-06-01

## 2021-07-23 RX ORDER — SERTRALINE HYDROCHLORIDE 100 MG/1
100 TABLET, FILM COATED ORAL 2 TIMES DAILY
Qty: 180 TABLET | Refills: 0 | Status: SHIPPED | OUTPATIENT
Start: 2021-07-23 | End: 2021-10-19

## 2021-07-23 NOTE — TELEPHONE ENCOUNTER
Requested Prescriptions     Name from pharmacy: Sertraline Hydrochloride 100 Mg Tab Nort         Will file in chart as: SERTRALINE  MG Oral Tab    Sig: Take 1 tablet (100 mg total) by mouth 2 (two) times daily.     Disp:  180 tablet    Refills:  0

## 2021-07-30 ENCOUNTER — OFFICE VISIT (OUTPATIENT)
Dept: FAMILY MEDICINE CLINIC | Facility: CLINIC | Age: 41
End: 2021-07-30
Payer: COMMERCIAL

## 2021-07-30 VITALS
OXYGEN SATURATION: 99 % | RESPIRATION RATE: 16 BRPM | HEIGHT: 70 IN | SYSTOLIC BLOOD PRESSURE: 130 MMHG | BODY MASS INDEX: 34.36 KG/M2 | DIASTOLIC BLOOD PRESSURE: 80 MMHG | TEMPERATURE: 98 F | HEART RATE: 84 BPM | WEIGHT: 240 LBS

## 2021-07-30 DIAGNOSIS — R91.1 LUNG NODULE: Primary | ICD-10-CM

## 2021-07-30 PROCEDURE — 3075F SYST BP GE 130 - 139MM HG: CPT | Performed by: FAMILY MEDICINE

## 2021-07-30 PROCEDURE — 3079F DIAST BP 80-89 MM HG: CPT | Performed by: FAMILY MEDICINE

## 2021-07-30 PROCEDURE — 3008F BODY MASS INDEX DOCD: CPT | Performed by: FAMILY MEDICINE

## 2021-07-30 PROCEDURE — 99213 OFFICE O/P EST LOW 20 MIN: CPT | Performed by: FAMILY MEDICINE

## 2021-07-30 NOTE — PROGRESS NOTES
CHIEF COMPLAINT: Patient is here for follow-up of attention deficit disorder. They are doing well with their medication and they do not have any side effects. No chest pain, palpitations, insomnia, or involuntary weight loss.      REVIEW OF SYSTEMS:   All of Stress :   Social Connections:       Frequency of Communication with Friends and Family:       Frequency of Social Gatherings with Friends and Family:       Attends Nondenominational Services:       Active Member of Clubs or Organizations:       Attends Club or tablet, Rfl: 0  Pantoprazole Sodium 40 MG Oral Tab EC, Take 1 tablet by mouth 2 (two) times a day. Take 1 @ Lunch time, Take 1 @ dinner , Disp: , Rfl:   Lisdexamfetamine Dimesylate (VYVANSE) 30 MG Oral Cap, Take 1 capsule (30 mg total) by mouth daily. , Dis

## 2021-08-20 ENCOUNTER — TELEPHONE (OUTPATIENT)
Dept: FAMILY MEDICINE CLINIC | Facility: CLINIC | Age: 41
End: 2021-08-20

## 2021-09-09 NOTE — TELEPHONE ENCOUNTER
Alfie Richmond noted.
Conversation: CT Scan  (Newest Message First)  Néstor Leonard  to Jenn Gasca, RN    [facility]    8/17/21 4:44 PM  [id number] [address], thank you  Jenn Gasca, SID  to Néstor Leonard        8/17/21 11:33 AM  Hi Boby Cross,  Since the CT scan Dr Garcia Coe ordered was denied by
Did anyone see the last TE regarding CT scan for this patient. Please check.
Last CT chest 3/22/21 at [facility]. Showed multiple small pulmonary nodules. It is recommended to have repeat CT in 3 to 6 months from that time. bcbsil denied CT chest saying it was <6 mths.      Thus, try to get authorization after 9/23/21 for CT chest.
Pt was given referral information and notified that CT was denied, pt thanked for the information. Pt was also sent a letter with t infromation. See Zarbee'st message 8/17/2021. Pt has not yet seen pulm.
Yes, please get opinion from pulmonary regarding CT chest findings from 3/22/21.     (NW CT chest done 3/22/21 on Media tab under date 6/22/21)
36.8

## 2021-09-21 RX ORDER — LISDEXAMFETAMINE DIMESYLATE 40 MG/1
CAPSULE ORAL
Qty: 30 CAPSULE | Refills: 0 | Status: SHIPPED | OUTPATIENT
Start: 2021-09-21 | End: 2022-01-05 | Stop reason: ALTCHOICE

## 2021-09-21 NOTE — TELEPHONE ENCOUNTER
Requesting Vyvanse 40mg  LOV: 7/30/21  RTC: 3 months  Last Relevant Labs: 1/4/21  Filled: 7/30/21 #30 with 0 refills    Future Appointments   Date Time Provider Alli Felipe   10/29/2021 11:00 AM Len Oakley MD [facility] [address]     Per [place] ,

## 2021-09-29 ENCOUNTER — TELEPHONE (OUTPATIENT)
Dept: FAMILY MEDICINE CLINIC | Facility: CLINIC | Age: 41
End: 2021-09-29

## 2021-09-29 NOTE — TELEPHONE ENCOUNTER
Pt states 1 month ago he started experiencing pain in his right breast around the nipple and could palpate nodules in that area.  Pt states the nodules have increased in size and are becoming increasingly tender, pt requesting US or some type of imaging of

## 2021-09-29 NOTE — TELEPHONE ENCOUNTER
Pt is having chest pain and states he feels lumps in his chest.  This has been going on for a few weeks. Lumps are growing. Please advise.  [facility] [phone]

## 2021-09-29 NOTE — TELEPHONE ENCOUNTER
Spoke with pt and informed of information below, pt scheduled appointment.   Future Appointments   Date Time Provider Alli Destinee   10/4/2021 11:00 AM Shay Blanchard MD [facility] [address]   10/29/2021 11:00 AM Shay Blanchard MD [facility] [address]

## 2021-10-04 ENCOUNTER — OFFICE VISIT (OUTPATIENT)
Dept: FAMILY MEDICINE CLINIC | Facility: CLINIC | Age: 41
End: 2021-10-04
Payer: COMMERCIAL

## 2021-10-04 VITALS
BODY MASS INDEX: 34.65 KG/M2 | DIASTOLIC BLOOD PRESSURE: 90 MMHG | HEART RATE: 91 BPM | RESPIRATION RATE: 18 BRPM | HEIGHT: 70 IN | TEMPERATURE: 98 F | WEIGHT: 242 LBS | OXYGEN SATURATION: 99 % | SYSTOLIC BLOOD PRESSURE: 130 MMHG

## 2021-10-04 DIAGNOSIS — N63.0 BREAST MASS: Primary | ICD-10-CM

## 2021-10-04 PROCEDURE — 99213 OFFICE O/P EST LOW 20 MIN: CPT | Performed by: FAMILY MEDICINE

## 2021-10-04 PROCEDURE — 3008F BODY MASS INDEX DOCD: CPT | Performed by: FAMILY MEDICINE

## 2021-10-04 PROCEDURE — 3075F SYST BP GE 130 - 139MM HG: CPT | Performed by: FAMILY MEDICINE

## 2021-10-04 PROCEDURE — 3080F DIAST BP >= 90 MM HG: CPT | Performed by: FAMILY MEDICINE

## 2021-10-04 RX ORDER — SUCRALFATE 1 G/1
TABLET ORAL
COMMUNITY
Start: 2021-09-24 | End: 2022-01-16

## 2021-10-04 NOTE — PROGRESS NOTES
HPI:   Tomer Mcadams is a 37year old male that presents for Patient presents with:  Breast Lump: tenderness-right sided-nipple area x 1 mos on and off. Lump on breast-couple more have been noticed-one under armpit.      Over the past month patient has not Disp: , Rfl: 1  •  Multiple Vitamin (MULTI-VITAMIN DAILY OR), Take  by mouth., Disp: , Rfl:     REVIEW OF SYSTEMS:     Comprehensive ROS negative except where noted in HPI    PHYSICAL EXAM:   /90   Pulse 91   Temp 97.9 °F (36.6 °C) (Temporal)   Resp discussing treatment options and completing documentation.

## 2021-10-05 ENCOUNTER — TELEPHONE (OUTPATIENT)
Dept: FAMILY MEDICINE CLINIC | Facility: CLINIC | Age: 41
End: 2021-10-05

## 2021-10-05 ENCOUNTER — HOSPITAL ENCOUNTER (OUTPATIENT)
Dept: MAMMOGRAPHY | Facility: HOSPITAL | Age: 41
Discharge: HOME | End: 2021-10-05
Attending: FAMILY MEDICINE
Payer: COMMERCIAL

## 2021-10-05 ENCOUNTER — HOSPITAL ENCOUNTER (OUTPATIENT)
Dept: ULTRASOUND IMAGING | Facility: HOSPITAL | Age: 41
Discharge: HOME | End: 2021-10-05
Attending: FAMILY MEDICINE
Payer: COMMERCIAL

## 2021-10-05 DIAGNOSIS — N63.20 MASSES OF BOTH BREASTS: ICD-10-CM

## 2021-10-05 DIAGNOSIS — N63.10 MASSES OF BOTH BREASTS: ICD-10-CM

## 2021-10-05 DIAGNOSIS — N63.20 MASSES OF BOTH BREASTS: Primary | ICD-10-CM

## 2021-10-05 DIAGNOSIS — N63.10 MASSES OF BOTH BREASTS: Primary | ICD-10-CM

## 2021-10-05 DIAGNOSIS — N63.0 BREAST MASS: ICD-10-CM

## 2021-10-05 PROCEDURE — 77062 BREAST TOMOSYNTHESIS BI: CPT | Performed by: FAMILY MEDICINE

## 2021-10-05 PROCEDURE — 76642 ULTRASOUND BREAST LIMITED: CPT | Performed by: FAMILY MEDICINE

## 2021-10-05 PROCEDURE — 77066 DX MAMMO INCL CAD BI: CPT | Performed by: FAMILY MEDICINE

## 2021-10-05 NOTE — TELEPHONE ENCOUNTER
Radiology called patient is there for a mammogram and the order needs to state bilateral diagnostic mammogram, DX breast masses  New order entered

## 2021-10-18 NOTE — TELEPHONE ENCOUNTER
Requesting Sertraline 100mg  LOV: 10/4/21  RTC: prn  Last Relevant Labs: 1/4/21  Filled: 7/23/21 #180 with 0 refills    Future Appointments   Date Time Provider Alli Felipe   1/5/2022 11:00 AM Zac Davey MD [facility]   1/14/20

## 2021-10-19 RX ORDER — SERTRALINE HYDROCHLORIDE 100 MG/1
100 TABLET, FILM COATED ORAL 2 TIMES DAILY
Qty: 180 TABLET | Refills: 3 | Status: SHIPPED | OUTPATIENT
Start: 2021-10-19

## 2021-11-01 RX ORDER — LISDEXAMFETAMINE DIMESYLATE 40 MG/1
CAPSULE ORAL
Qty: 30 CAPSULE | Refills: 0 | OUTPATIENT
Start: 2021-11-01

## 2022-01-05 ENCOUNTER — OFFICE VISIT (OUTPATIENT)
Dept: SURGERY | Facility: CLINIC | Age: 42
End: 2022-01-05
Payer: COMMERCIAL

## 2022-01-05 VITALS
HEIGHT: 70 IN | HEART RATE: 79 BPM | RESPIRATION RATE: 16 BRPM | DIASTOLIC BLOOD PRESSURE: 98 MMHG | WEIGHT: 232.19 LBS | BODY MASS INDEX: 33.24 KG/M2 | OXYGEN SATURATION: 99 % | SYSTOLIC BLOOD PRESSURE: 138 MMHG

## 2022-01-05 DIAGNOSIS — N64.4 BREAST PAIN, RIGHT: Primary | ICD-10-CM

## 2022-01-05 DIAGNOSIS — N63.10 MASS OF RIGHT BREAST, UNSPECIFIED QUADRANT: ICD-10-CM

## 2022-01-05 PROCEDURE — 3008F BODY MASS INDEX DOCD: CPT | Performed by: SURGERY

## 2022-01-05 PROCEDURE — 99244 OFF/OP CNSLTJ NEW/EST MOD 40: CPT | Performed by: SURGERY

## 2022-01-05 PROCEDURE — 3080F DIAST BP >= 90 MM HG: CPT | Performed by: SURGERY

## 2022-01-05 PROCEDURE — 3075F SYST BP GE 130 - 139MM HG: CPT | Performed by: SURGERY

## 2022-01-05 RX ORDER — METOPROLOL SUCCINATE 25 MG/1
25 TABLET, EXTENDED RELEASE ORAL DAILY
COMMUNITY
Start: 2021-12-23

## 2022-01-14 ENCOUNTER — OFFICE VISIT (OUTPATIENT)
Dept: FAMILY MEDICINE CLINIC | Facility: CLINIC | Age: 42
End: 2022-01-14
Payer: COMMERCIAL

## 2022-01-14 VITALS
WEIGHT: 243 LBS | BODY MASS INDEX: 34.79 KG/M2 | TEMPERATURE: 98 F | HEIGHT: 70 IN | SYSTOLIC BLOOD PRESSURE: 122 MMHG | HEART RATE: 81 BPM | OXYGEN SATURATION: 97 % | RESPIRATION RATE: 16 BRPM | DIASTOLIC BLOOD PRESSURE: 80 MMHG

## 2022-01-14 DIAGNOSIS — R53.83 FATIGUE, UNSPECIFIED TYPE: ICD-10-CM

## 2022-01-14 DIAGNOSIS — Z00.00 ROUTINE HEALTH MAINTENANCE: Primary | ICD-10-CM

## 2022-01-14 DIAGNOSIS — R73.9 ELEVATED BLOOD SUGAR: ICD-10-CM

## 2022-01-14 DIAGNOSIS — E55.9 VITAMIN D DEFICIENCY: ICD-10-CM

## 2022-01-14 PROCEDURE — 3079F DIAST BP 80-89 MM HG: CPT | Performed by: FAMILY MEDICINE

## 2022-01-14 PROCEDURE — 3074F SYST BP LT 130 MM HG: CPT | Performed by: FAMILY MEDICINE

## 2022-01-14 PROCEDURE — 3008F BODY MASS INDEX DOCD: CPT | Performed by: FAMILY MEDICINE

## 2022-01-14 PROCEDURE — 99396 PREV VISIT EST AGE 40-64: CPT | Performed by: FAMILY MEDICINE

## 2022-01-16 NOTE — PROGRESS NOTES
Jalen Booth is a 37year old male who presents for a complete physical exam.   HPI:   Pt complains of nothing.   Urination changes no  ED symptoms no  Immunizations needed no  Wt Readings from Last 6 Encounters:  01/14/22 : 243 lb (110.2 kg)  01/05/22 : fL    ABSOLUTE NEUTROPHILS 3,020 1,500 - 7,800 cells/uL    ABSOLUTE LYMPHOCYTES 1,381 850 - 3,900 cells/uL    ABSOLUTE MONOCYTES 484 200 - 950 cells/uL    ABSOLUTE EOSINOPHILS 545 (H) 15 - 500 cells/uL    ABSOLUTE BASOPHILS 72 0 - 200 cells/uL    NEUTROPHI Breast,abdominal, lung Cancer      Social History:  Social History    Tobacco Use      Smoking status: Never Smoker      Smokeless tobacco: Never Used    Vaping Use      Vaping Use: Never used    Alcohol use: No    Drug use: No        REVIEW OF SYST normoactive BS, non-distended, non-tender to palpation, no HSM/masses/pulsations  MUSCULOSKELETAL: Back with normal AROM, no joint swelling, extremities x 4 with normal strength 5/5 and symmetric and with normal AROM/PROM.    EXTREMITIES: no cyanosis, clubb vitamin D deficiency and fatigue. We will check routine blood work as well. Will refill Vyvanse at 40 mg daily he is doing well with this. I would like to see him back once in 6 months for ADD.   Pt educated on immunizations and health maintenance appr

## 2022-01-17 ENCOUNTER — TELEPHONE (OUTPATIENT)
Dept: FAMILY MEDICINE CLINIC | Facility: CLINIC | Age: 42
End: 2022-01-17

## 2022-01-17 NOTE — TELEPHONE ENCOUNTER
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 Also wants an order for a covid test Thursday, since he was just here, please advise.

## 2022-05-31 RX ORDER — LISDEXAMFETAMINE DIMESYLATE 40 MG/1
CAPSULE ORAL
Qty: 30 CAPSULE | Refills: 0 | Status: SHIPPED | OUTPATIENT
Start: 2022-05-31

## 2022-05-31 NOTE — TELEPHONE ENCOUNTER
Requesting Vyvanse 40mg  LOV: 1/14/22 Physical  RTC: 6 months  Last Relevant Labs: 1/4/21  Filled: 3/19/22 #30 with 0 refills    No future appointments.     Rx pended and routed for approval/denial

## 2022-07-27 LAB
ABSOLUTE BASOPHILS: 80 CELLS/UL (ref 0–200)
ABSOLUTE EOSINOPHILS: 390 CELLS/UL (ref 15–500)
ABSOLUTE LYMPHOCYTES: 1630 CELLS/UL (ref 850–3900)
ABSOLUTE MONOCYTES: 440 CELLS/UL (ref 200–950)
ABSOLUTE NEUTROPHILS: 2460 CELLS/UL (ref 1500–7800)
ALBUMIN/GLOBULIN RATIO: 2 (CALC) (ref 1–2.5)
ALBUMIN: 4.4 G/DL (ref 3.6–5.1)
ALKALINE PHOSPHATASE: 41 U/L (ref 36–130)
ALT: 37 U/L (ref 9–46)
AST: 29 U/L (ref 10–40)
BASOPHILS: 1.6 %
BILIRUBIN, TOTAL: 0.7 MG/DL (ref 0.2–1.2)
BUN: 13 MG/DL (ref 7–25)
CALCIUM: 9.3 MG/DL (ref 8.6–10.3)
CARBON DIOXIDE: 28 MMOL/L (ref 20–32)
CHLORIDE: 106 MMOL/L (ref 98–110)
CHOL/HDLC RATIO: 5 (CALC)
CHOLESTEROL, TOTAL: 149 MG/DL
CREATININE: 1.17 MG/DL (ref 0.6–1.29)
EGFR: 80 ML/MIN/1.73M2
EOSINOPHILS: 7.8 %
GLOBULIN: 2.2 G/DL (CALC) (ref 1.9–3.7)
GLUCOSE: 83 MG/DL (ref 65–99)
HDL CHOLESTEROL: 30 MG/DL
HEMATOCRIT: 44.8 % (ref 38.5–50)
HEMOGLOBIN A1C: 4.5 % OF TOTAL HGB
HEMOGLOBIN: 15 G/DL (ref 13.2–17.1)
LDL-CHOLESTEROL: 85 MG/DL (CALC)
LYMPHOCYTES: 32.6 %
MCH: 29.1 PG (ref 27–33)
MCHC: 33.5 G/DL (ref 32–36)
MCV: 87 FL (ref 80–100)
MONOCYTES: 8.8 %
MPV: 11.3 FL (ref 7.5–12.5)
NEUTROPHILS: 49.2 %
NON-HDL CHOLESTEROL: 119 MG/DL (CALC)
PLATELET COUNT: 115 THOUSAND/UL (ref 140–400)
POTASSIUM: 4.4 MMOL/L (ref 3.5–5.3)
PROTEIN, TOTAL: 6.6 G/DL (ref 6.1–8.1)
RDW: 14 % (ref 11–15)
RED BLOOD CELL COUNT: 5.15 MILLION/UL (ref 4.2–5.8)
SODIUM: 142 MMOL/L (ref 135–146)
T4, FREE: 1.2 NG/DL (ref 0.8–1.8)
TRIGLYCERIDES: 260 MG/DL
TSH: 2.84 MIU/L (ref 0.4–4.5)
VITAMIN D, 25-OH, TOTAL: 39 NG/ML (ref 30–100)
WHITE BLOOD CELL COUNT: 5 THOUSAND/UL (ref 3.8–10.8)

## 2022-08-09 ENCOUNTER — PATIENT MESSAGE (OUTPATIENT)
Dept: FAMILY MEDICINE CLINIC | Facility: CLINIC | Age: 42
End: 2022-08-09

## 2022-08-26 ENCOUNTER — OFFICE VISIT (OUTPATIENT)
Dept: FAMILY MEDICINE CLINIC | Facility: CLINIC | Age: 42
End: 2022-08-26
Payer: COMMERCIAL

## 2022-08-26 VITALS
SYSTOLIC BLOOD PRESSURE: 124 MMHG | HEART RATE: 78 BPM | TEMPERATURE: 97 F | OXYGEN SATURATION: 99 % | RESPIRATION RATE: 18 BRPM | WEIGHT: 247 LBS | HEIGHT: 70 IN | BODY MASS INDEX: 35.36 KG/M2 | DIASTOLIC BLOOD PRESSURE: 88 MMHG

## 2022-08-26 DIAGNOSIS — Z84.81 FAMILY HISTORY OF BRCA GENE MUTATION: Primary | ICD-10-CM

## 2022-08-26 DIAGNOSIS — G47.33 OSA (OBSTRUCTIVE SLEEP APNEA): ICD-10-CM

## 2022-08-26 DIAGNOSIS — F98.8 ATTENTION DEFICIT DISORDER (ADD) WITHOUT HYPERACTIVITY: ICD-10-CM

## 2022-08-26 DIAGNOSIS — E78.2 ELEVATED CHOLESTEROL WITH HIGH TRIGLYCERIDES: ICD-10-CM

## 2022-08-26 PROCEDURE — 3008F BODY MASS INDEX DOCD: CPT | Performed by: FAMILY MEDICINE

## 2022-08-26 PROCEDURE — 99214 OFFICE O/P EST MOD 30 MIN: CPT | Performed by: FAMILY MEDICINE

## 2022-08-26 PROCEDURE — 3079F DIAST BP 80-89 MM HG: CPT | Performed by: FAMILY MEDICINE

## 2022-08-26 PROCEDURE — 3074F SYST BP LT 130 MM HG: CPT | Performed by: FAMILY MEDICINE

## 2022-10-05 RX ORDER — SERTRALINE HYDROCHLORIDE 100 MG/1
100 TABLET, FILM COATED ORAL 2 TIMES DAILY
Qty: 180 TABLET | Refills: 1 | Status: SHIPPED | OUTPATIENT
Start: 2022-10-05

## 2023-01-24 ENCOUNTER — TELEPHONE (OUTPATIENT)
Dept: FAMILY MEDICINE CLINIC | Facility: CLINIC | Age: 43
End: 2023-01-24

## 2023-01-24 NOTE — TELEPHONE ENCOUNTER
Clarify dosage with patient, 30mg or 40mg. Despite some risk factors, the patient does not demonstrate definitive evidence of glaucoma at this time.

## 2023-02-01 ENCOUNTER — OFFICE VISIT (OUTPATIENT)
Dept: FAMILY MEDICINE CLINIC | Facility: CLINIC | Age: 43
End: 2023-02-01
Payer: COMMERCIAL

## 2023-02-01 VITALS
TEMPERATURE: 97 F | RESPIRATION RATE: 16 BRPM | HEIGHT: 70 IN | DIASTOLIC BLOOD PRESSURE: 88 MMHG | HEART RATE: 85 BPM | SYSTOLIC BLOOD PRESSURE: 120 MMHG | WEIGHT: 243 LBS | BODY MASS INDEX: 34.79 KG/M2 | OXYGEN SATURATION: 98 %

## 2023-02-01 DIAGNOSIS — Z00.00 ROUTINE ADULT HEALTH MAINTENANCE: Primary | ICD-10-CM

## 2023-02-01 DIAGNOSIS — E66.09 CLASS 1 OBESITY DUE TO EXCESS CALORIES WITHOUT SERIOUS COMORBIDITY WITH BODY MASS INDEX (BMI) OF 34.0 TO 34.9 IN ADULT: ICD-10-CM

## 2023-02-01 PROCEDURE — 3008F BODY MASS INDEX DOCD: CPT | Performed by: FAMILY MEDICINE

## 2023-02-01 PROCEDURE — 3079F DIAST BP 80-89 MM HG: CPT | Performed by: FAMILY MEDICINE

## 2023-02-01 PROCEDURE — 3074F SYST BP LT 130 MM HG: CPT | Performed by: FAMILY MEDICINE

## 2023-02-01 PROCEDURE — 99396 PREV VISIT EST AGE 40-64: CPT | Performed by: FAMILY MEDICINE

## 2023-02-02 PROBLEM — E66.09 CLASS 2 OBESITY DUE TO EXCESS CALORIES WITHOUT SERIOUS COMORBIDITY WITH BODY MASS INDEX (BMI) OF 35.0 TO 35.9 IN ADULT: Status: RESOLVED | Noted: 2019-05-03 | Resolved: 2023-02-02

## 2023-02-02 PROBLEM — E66.811 CLASS 1 OBESITY DUE TO EXCESS CALORIES WITHOUT SERIOUS COMORBIDITY WITH BODY MASS INDEX (BMI) OF 34.0 TO 34.9 IN ADULT: Status: ACTIVE | Noted: 2023-02-02

## 2023-02-02 PROBLEM — E66.09 CLASS 1 OBESITY DUE TO EXCESS CALORIES WITHOUT SERIOUS COMORBIDITY WITH BODY MASS INDEX (BMI) OF 34.0 TO 34.9 IN ADULT: Status: ACTIVE | Noted: 2023-02-02

## 2023-02-02 PROBLEM — E66.812 CLASS 2 OBESITY DUE TO EXCESS CALORIES WITHOUT SERIOUS COMORBIDITY WITH BODY MASS INDEX (BMI) OF 35.0 TO 35.9 IN ADULT: Status: RESOLVED | Noted: 2019-05-03 | Resolved: 2023-02-02

## 2023-04-17 RX ORDER — LISDEXAMFETAMINE DIMESYLATE 30 MG/1
CAPSULE ORAL
Qty: 30 CAPSULE | Refills: 0 | Status: SHIPPED | OUTPATIENT
Start: 2023-04-17

## 2023-04-17 NOTE — TELEPHONE ENCOUNTER
Requesting Vyvanse 30mg  LOV: 2/1/23 Physical  RTC: 6 months  Last Relevant Labs: 7/26/22  Filled: 3/10/23 #30 with 0 refills    No future appointments.     [place] , last [place] 3/10/23 #30    Rx pended and routed for approval/denial

## 2023-05-12 RX ORDER — SERTRALINE HYDROCHLORIDE 100 MG/1
100 TABLET, FILM COATED ORAL 2 TIMES DAILY
Qty: 180 TABLET | Refills: 1 | Status: SHIPPED | OUTPATIENT
Start: 2023-05-12

## 2023-05-12 NOTE — TELEPHONE ENCOUNTER
Patient is requesting a refill on: Sertraline 100mg BID? Last LOV: 2/1/23  Last Refill: 4/14/23  No future appointments.       Non- protocol Medication  RX pended and routed to provider for approval

## 2023-05-30 RX ORDER — LISDEXAMFETAMINE DIMESYLATE 30 MG/1
CAPSULE ORAL
Qty: 30 CAPSULE | Refills: 0 | OUTPATIENT
Start: 2023-05-30

## 2023-07-22 DIAGNOSIS — F98.8 ATTENTION DEFICIT DISORDER (ADD) WITHOUT HYPERACTIVITY: ICD-10-CM

## 2023-07-24 NOTE — TELEPHONE ENCOUNTER
Requesting Vyvanse 30mg  LOV: 6/12/23  RTC: prn  Last Relevant Labs: 7/26/22  Filled: 6/12/23 #30 with 0 refills    No future appointments.     [place] , [place] 6/12/23 #30    Rx pended and routed for approval/denial

## 2023-09-11 DIAGNOSIS — F98.8 ATTENTION DEFICIT DISORDER (ADD) WITHOUT HYPERACTIVITY: ICD-10-CM

## 2023-10-27 NOTE — TELEPHONE ENCOUNTER
Requesting Sertraline 100mg  LOV: 6/12/23  RTC: prn  Last Relevant Labs: 7/26/22  Filled: 5/12/23 #180 with 1 refills    No future appointments.     Non-protocol med:  Rx pended and routed for approval/denial

## 2023-10-29 DIAGNOSIS — F98.8 ATTENTION DEFICIT DISORDER (ADD) WITHOUT HYPERACTIVITY: ICD-10-CM

## 2023-10-30 RX ORDER — SERTRALINE HYDROCHLORIDE 100 MG/1
100 TABLET, FILM COATED ORAL 2 TIMES DAILY
Qty: 180 TABLET | Refills: 0 | Status: SHIPPED | OUTPATIENT
Start: 2023-10-30

## 2023-10-30 RX ORDER — LISDEXAMFETAMINE DIMESYLATE CAPSULES 30 MG/1
30 CAPSULE ORAL EVERY MORNING
Qty: 30 CAPSULE | Refills: 0 | Status: SHIPPED | OUTPATIENT
Start: 2023-10-30

## 2023-10-30 NOTE — TELEPHONE ENCOUNTER
Requesting Vyvanse 30mg  LOV: 6/12/23  RTC: prn  Last Relevant Labs: 7/26/22  Filled: 9/12/23 #30 with 0 refills    No future appointments.     [place] , [place] 9/12/23 #30    Rx pended and routed for approval/denial

## 2023-12-01 ENCOUNTER — MED REC SCAN ONLY (OUTPATIENT)
Dept: FAMILY MEDICINE CLINIC | Facility: CLINIC | Age: 43
End: 2023-12-01

## 2023-12-30 DIAGNOSIS — F98.8 ATTENTION DEFICIT DISORDER (ADD) WITHOUT HYPERACTIVITY: ICD-10-CM

## 2024-01-02 RX ORDER — LISDEXAMFETAMINE DIMESYLATE CAPSULES 30 MG/1
30 CAPSULE ORAL EVERY MORNING
Qty: 30 CAPSULE | Refills: 0 | Status: SHIPPED | OUTPATIENT
Start: 2024-01-02 | End: 2024-02-20

## 2024-01-02 NOTE — TELEPHONE ENCOUNTER
Patient should be seen for ADD once in 6 months.  Due for 6-month follow-up appointment.  Can make with Maxi.    Will allow 30-day refill.

## 2024-01-23 RX ORDER — SERTRALINE HYDROCHLORIDE 100 MG/1
100 TABLET, FILM COATED ORAL 2 TIMES DAILY
Qty: 180 TABLET | Refills: 0 | Status: SHIPPED | OUTPATIENT
Start: 2024-01-23

## 2024-01-23 NOTE — TELEPHONE ENCOUNTER
Requesting Sertraline 100mg  Last OV: 6/12/23  RTC: not noted  Last Rx'd 10/30/23 #180 with 0 refills    No future appointments.    Non-protocol med:  Rx pended and routed for approval/denial

## 2024-02-18 DIAGNOSIS — F98.8 ATTENTION DEFICIT DISORDER (ADD) WITHOUT HYPERACTIVITY: ICD-10-CM

## 2024-02-20 RX ORDER — LISDEXAMFETAMINE DIMESYLATE CAPSULES 30 MG/1
30 CAPSULE ORAL EVERY MORNING
Qty: 30 CAPSULE | Refills: 0 | Status: SHIPPED | OUTPATIENT
Start: 2024-02-20

## 2024-02-20 NOTE — TELEPHONE ENCOUNTER
Patient is requesting a refill on Vyvanse 30 mg # 30    LOV- 6/12/23    Last Refill- 1/2/24    No future appointments.

## 2024-03-29 ENCOUNTER — TELEPHONE (OUTPATIENT)
Dept: FAMILY MEDICINE CLINIC | Facility: CLINIC | Age: 44
End: 2024-03-29

## 2024-03-29 DIAGNOSIS — R73.9 ELEVATED BLOOD SUGAR: ICD-10-CM

## 2024-03-29 DIAGNOSIS — R79.89 LOW VITAMIN D LEVEL: ICD-10-CM

## 2024-03-29 DIAGNOSIS — Z00.00 LABORATORY EXAM ORDERED AS PART OF ROUTINE GENERAL MEDICAL EXAMINATION: Primary | ICD-10-CM

## 2024-03-29 NOTE — TELEPHONE ENCOUNTER
Pt would like to have lab orders placed to have done prior to his upcoming appt.  Future Appointments   Date Time Provider Department Center   4/5/2024 10:00 AM Maxi Ruvalcaba APRN [facility] [address] [facility] [address]

## 2024-04-05 ENCOUNTER — OFFICE VISIT (OUTPATIENT)
Dept: FAMILY MEDICINE CLINIC | Facility: CLINIC | Age: 44
End: 2024-04-05
Payer: COMMERCIAL

## 2024-04-05 VITALS
RESPIRATION RATE: 18 BRPM | OXYGEN SATURATION: 97 % | SYSTOLIC BLOOD PRESSURE: 134 MMHG | HEIGHT: 70 IN | WEIGHT: 241.19 LBS | BODY MASS INDEX: 34.53 KG/M2 | HEART RATE: 78 BPM | DIASTOLIC BLOOD PRESSURE: 80 MMHG | TEMPERATURE: 98 F

## 2024-04-05 DIAGNOSIS — G47.33 OSA (OBSTRUCTIVE SLEEP APNEA): ICD-10-CM

## 2024-04-05 DIAGNOSIS — F98.8 ATTENTION DEFICIT DISORDER (ADD) WITHOUT HYPERACTIVITY: ICD-10-CM

## 2024-04-05 DIAGNOSIS — R53.83 FATIGUE, UNSPECIFIED TYPE: ICD-10-CM

## 2024-04-05 DIAGNOSIS — I77.819 AORTIC DILATATION (HCC): ICD-10-CM

## 2024-04-05 DIAGNOSIS — E78.2 ELEVATED CHOLESTEROL WITH HIGH TRIGLYCERIDES: ICD-10-CM

## 2024-04-05 DIAGNOSIS — Z00.00 WELLNESS EXAMINATION: Primary | ICD-10-CM

## 2024-04-05 RX ORDER — LISDEXAMFETAMINE DIMESYLATE CAPSULES 30 MG/1
30 CAPSULE ORAL DAILY
Qty: 30 CAPSULE | Refills: 0 | Status: SHIPPED | OUTPATIENT
Start: 2024-06-06 | End: 2024-07-06

## 2024-04-05 RX ORDER — IBUPROFEN 600 MG/1
600 TABLET ORAL
COMMUNITY
Start: 2023-11-26

## 2024-04-05 RX ORDER — SUCRALFATE 1 G/1
1 TABLET ORAL 2 TIMES DAILY PRN
COMMUNITY
Start: 2024-03-07

## 2024-04-05 RX ORDER — LISDEXAMFETAMINE DIMESYLATE CAPSULES 30 MG/1
30 CAPSULE ORAL DAILY
Qty: 30 CAPSULE | Refills: 0 | Status: SHIPPED | OUTPATIENT
Start: 2024-04-05 | End: 2024-05-05

## 2024-04-05 RX ORDER — LISDEXAMFETAMINE DIMESYLATE CAPSULES 30 MG/1
30 CAPSULE ORAL DAILY
Qty: 30 CAPSULE | Refills: 0 | Status: SHIPPED | OUTPATIENT
Start: 2024-05-06 | End: 2024-06-05

## 2024-04-05 NOTE — PROGRESS NOTES
Subjective:   Denny Torres is a 43 year old male who presents for Physical (Patient here for yearly physical.)   The patient is a 43-year-old male who presents today for his yearly physical examination. He reports no new complaints or concerns since his last visit.    Regarding his medical history:    The patient has a history of aortic dilation, for which he is followed by cardiology. There have been no recent changes or complications related to this condition.  He also has a history of depression, which is well-controlled at this time. He is compliant with his prescribed medications. The patient has a history of hypertension (HTN), which is currently well-controlled.   Additionally, he has a history of elevated cholesterol, attention deficit disorder (ADD), gastroesophageal reflux disease (GERD), and obstructive sleep apnea (BENJI). These conditions are also well-controlled, and he maintains appropriate follow-up with his healthcare providers for management.  The patient denies any new symptoms such as chest pain, shortness of breath, palpitations, dizziness, or changes in mood. He reports compliance with his medication regimen and states that he has been adhering to lifestyle modifications recommended by his healthcare providers, including dietary changes, exercise, and stress management techniques.       Is a patient with a known diagnosis of Attention-Deficit/Hyperactivity Disorder (ADHD) who has been consistently taking their medication as prescribed. The patient denies experiencing any notable side effects. They state that they have been diligently adhering to the prescribed dosage and schedule.The patient describes a positive response to the medication, noting improved focus, increased attention span, and better impulse control. They report a positive impact on daily activities, both academically and in personal life. The patient has not observed any disruptions to sleep patterns, appetite changes, or mood  fluctuations related to the medication.  The patient denies any recent changes in medical or psychiatric conditions, including the absence of new stressors or challenges. Overall, they express satisfaction with the current medication management and its positive impact on their daily functioning.     History/Other:    Chief Complaint Reviewed and Verified  Nursing Notes Reviewed and   Verified  Tobacco Reviewed  Allergies Reviewed  Medications Reviewed    Social History Reviewed         Tobacco:  He has never smoked tobacco.    Current Outpatient Medications   Medication Sig Dispense Refill    ibuprofen 600 MG Oral Tab Take 1 tablet (600 mg total) by mouth.      sucralfate 1 g Oral Tab Take 1 tablet (1 g total) by mouth 2 (two) times daily as needed.      lisdexamfetamine (VYVANSE) 30 MG Oral Cap Take 1 capsule (30 mg total) by mouth daily. 30 capsule 0    [START ON 5/6/2024] lisdexamfetamine (VYVANSE) 30 MG Oral Cap Take 1 capsule (30 mg total) by mouth daily. 30 capsule 0    [START ON 6/6/2024] lisdexamfetamine (VYVANSE) 30 MG Oral Cap Take 1 capsule (30 mg total) by mouth daily. 30 capsule 0    LISDEXAMFETAMINE 30 MG Oral Cap Take 1 capsule (30 mg total) by mouth every morning. 30 capsule 0    sertraline 100 MG Oral Tab Take 1 tablet (100 mg total) by mouth 2 (two) times daily. 180 tablet 0    metoprolol succinate 25 MG Oral Tablet 24 Hr Take 1 tablet (25 mg total) by mouth daily.      Pantoprazole Sodium 40 MG Oral Tab EC Take 1 tablet (40 mg total) by mouth in the morning and 1 tablet (40 mg total) before bedtime. Take 1 @ Lunch time, Take 1 @ dinner.      Multiple Vitamin (MULTI-VITAMIN DAILY OR) Take  by mouth.           Review of Systems:  Review of Systems   Constitutional:  Positive for fatigue. Negative for activity change and unexpected weight change.   HENT: Negative.  Negative for congestion, ear pain, postnasal drip and rhinorrhea.    Eyes: Negative.    Respiratory: Negative.  Negative for  shortness of breath and wheezing.    Cardiovascular:  Negative for chest pain and palpitations.   Gastrointestinal:  Negative for abdominal distention and abdominal pain.   Endocrine: Negative.    Genitourinary: Negative.  Negative for decreased urine volume, difficulty urinating, dysuria, testicular pain and urgency.   Allergic/Immunologic: Negative.    Neurological: Negative.    Hematological: Negative.    Psychiatric/Behavioral:  Positive for decreased concentration (better since taking medication as prescribed.).         Increased stress but is dealing with it well.        Objective:   /80 (BP Location: Right arm, Patient Position: Sitting, Cuff Size: adult)   Pulse 78   Temp 97.5 °F (36.4 °C) (Temporal)   Resp 18   Ht 5' 10\" (1.778 m)   Wt 241 lb 3.2 oz (109.4 kg)   SpO2 97%   BMI 34.61 kg/m²  Estimated body mass index is 34.61 kg/m² as calculated from the following:    Height as of this encounter: 5' 10\" (1.778 m).    Weight as of this encounter: 241 lb 3.2 oz (109.4 kg).  Physical Exam  Constitutional:       Appearance: Normal appearance. He is well-developed.   HENT:      Head: Normocephalic and atraumatic.      Nose: No congestion or rhinorrhea.      Mouth/Throat:      Mouth: Mucous membranes are moist.      Pharynx: Oropharynx is clear.   Eyes:      Conjunctiva/sclera: Conjunctivae normal.   Cardiovascular:      Rate and Rhythm: Normal rate.      Heart sounds: Normal heart sounds.   Pulmonary:      Effort: Pulmonary effort is normal.      Breath sounds: Normal breath sounds.   Abdominal:      Palpations: Abdomen is soft.   Musculoskeletal:         General: Normal range of motion.      Cervical back: Normal range of motion.   Skin:     General: Skin is warm and dry.   Neurological:      Mental Status: He is alert and oriented to person, place, and time.      Deep Tendon Reflexes: Reflexes are normal and symmetric.   Psychiatric:         Mood and Affect: Mood normal.         Behavior: Behavior  normal.         Thought Content: Thought content normal.         Judgment: Judgment normal.         Assessment & Plan:   1. Wellness examination (Primary)  2. Attention deficit disorder (ADD) without hyperactivity  -     Lisdexamfetamine Dimesylate; Take 1 capsule (30 mg total) by mouth daily.  Dispense: 30 capsule; Refill: 0  -     Lisdexamfetamine Dimesylate; Take 1 capsule (30 mg total) by mouth daily.  Dispense: 30 capsule; Refill: 0  -     Lisdexamfetamine Dimesylate; Take 1 capsule (30 mg total) by mouth daily.  Dispense: 30 capsule; Refill: 0  3. Aortic dilatation (HCC)  4. Elevated cholesterol with high triglycerides  5. BENJI (obstructive sleep apnea)  6. Fatigue, unspecified type    Patient to follow up with cardiology for BP monitoring, medication, and CT scan for aortic dilation.   Follow up in this clinic for Adderall  and medication refills as needed.   Discussion about general health and wellness screening.   Patient is agreeable to getting lab work done to evaluate and monitor as part of screening and prevention of health issues.  Discussion about general diet and increasing activity.  Discussion about taking daily multivitamin.   Will discuss any abnormal values if they arise.      No follow-ups on file.    TALHA Quigley, 4/5/2024, 12:45 PM

## 2024-04-24 RX ORDER — SERTRALINE HYDROCHLORIDE 100 MG/1
100 TABLET, FILM COATED ORAL 2 TIMES DAILY
Qty: 180 TABLET | Refills: 0 | Status: SHIPPED | OUTPATIENT
Start: 2024-04-24

## 2024-04-24 NOTE — TELEPHONE ENCOUNTER
Name from pharmacy: Sertraline Hydrochloride 100 Mg Tab Nort          Will file in chart as: SERTRALINE 100 MG Oral Tab    Sig: Take 1 tablet (100 mg total) by mouth 2 (two) times daily.    Disp: 180 tablet    Refills: 0    Start: 4/24/2024    Class: Normal    Non-formulary    Last ordered: 3 months ago (1/23/2024) by Isrrael Burton MD    Last refill: 3/28/2024    Rx #: [id number]    Psychiatric Non-Scheduled (Anti-Anxiety) [id number]/24/2024 01:31 AM   Protocol Details In person appointment or virtual visit in the past 6 mos or appointment in next 3 mos    Depression Screening completed within the past 12 months      To be filled at: [id number] - [place], [place] - [address] [place] [address] [phone], [phone]

## 2024-07-18 NOTE — TELEPHONE ENCOUNTER
Patient is requesting an refill on Sertraline 100 mg BID    LOV- 4/5/24 PX no mention of Sertraline

## 2024-07-19 RX ORDER — SERTRALINE HYDROCHLORIDE 100 MG/1
100 TABLET, FILM COATED ORAL 2 TIMES DAILY
Qty: 180 TABLET | Refills: 1 | Status: SHIPPED | OUTPATIENT
Start: 2024-07-19

## 2024-08-18 DIAGNOSIS — F98.8 ATTENTION DEFICIT DISORDER (ADD) WITHOUT HYPERACTIVITY: ICD-10-CM

## 2024-08-19 NOTE — TELEPHONE ENCOUNTER
Requesting Vyvanse 30mg  Last OV: 4/5/24 Physical  RTC: 3 months    No future appointments.    Per [place] , last dispensed 6/30/24 #30    Patient due for 3 month follow up. Please schedule.

## 2024-08-22 NOTE — TELEPHONE ENCOUNTER
Sent MyChart for patient to call office to schedule an appointment-can be in office or via video.

## 2024-08-27 RX ORDER — LISDEXAMFETAMINE DIMESYLATE 30 MG/1
30 CAPSULE ORAL DAILY
Qty: 30 CAPSULE | Refills: 0 | OUTPATIENT
Start: 2024-08-27

## 2024-10-16 NOTE — LETTER
12/18/19        Amy Reed  [address] [facility] [address]      Dear Hermila Jesus,    [facility] records indicate that you have outstanding lab work and or testing that was ordered for you and has not yet been completed:  Orders Placed This Encounter consult

## 2024-12-01 ENCOUNTER — MED REC SCAN ONLY (OUTPATIENT)
Dept: FAMILY MEDICINE CLINIC | Facility: CLINIC | Age: 44
End: 2024-12-01

## 2024-12-15 DIAGNOSIS — F98.8 ATTENTION DEFICIT DISORDER, UNSPECIFIED TYPE: ICD-10-CM

## 2024-12-15 RX ORDER — LISDEXAMFETAMINE DIMESYLATE 30 MG/1
30 CAPSULE ORAL DAILY
Qty: 30 CAPSULE | Refills: 0 | OUTPATIENT
Start: 2024-12-15 | End: 2025-01-14

## 2025-01-22 RX ORDER — SERTRALINE HYDROCHLORIDE 100 MG/1
100 TABLET, FILM COATED ORAL 2 TIMES DAILY
Qty: 180 TABLET | Refills: 0 | Status: SHIPPED | OUTPATIENT
Start: 2025-01-22

## 2025-01-22 NOTE — TELEPHONE ENCOUNTER
Name from pharmacy: Sertraline Hydrochloride 100 Mg Tab Nort          Will file in chart as: SERTRALINE 100 MG Oral Tab    Sig: Take 1 tablet (100 mg total) by mouth 2 (two) times daily.    Disp: 180 tablet    Refills: 0    Start: 1/22/2025    Class: Normal    Non-formulary    Last ordered: 6 months ago (7/19/2024) by Isrrael Burton MD    Last refill: 12/18/2024    Rx #: [id number]    Psychiatric Non-Scheduled (Anti-Anxiety) [id number]/22/2025 09:48 AM   Protocol Details In person appointment or virtual visit in the past 6 mos or appointment in next 3 mos    Depression Screening completed within the past 12 months    Medication is active on med list      To be filled at: [id number] - [address] [phone], [phone]

## 2025-04-18 RX ORDER — SERTRALINE HYDROCHLORIDE 100 MG/1
100 TABLET, FILM COATED ORAL 2 TIMES DAILY
Qty: 180 TABLET | Refills: 0 | Status: SHIPPED | OUTPATIENT
Start: 2025-04-18

## 2025-04-18 NOTE — TELEPHONE ENCOUNTER
Requesting Sertraline 100mg  LOV: 1/14/25  RTC: 3 months  Last Relevant Labs: 7/26/22  Filled: 1/22/25 #180 with 0 refills    Future Appointments   Date Time Provider Department Center   6/4/2025  9:40 AM Isrrael Burton MD [facility] [address]     Psychiatric Non-Scheduled (Anti-Anxiety) [id number]/18/2025 01:38 PM   Protocol Details   Depression Screening completed within the past 12 months    In person appointment or virtual visit in the past 6 mos or appointment in next 3 mos    Medication is active on med list     Failed protocol:  Rx pended and routed for approval/denial

## 2025-04-27 DIAGNOSIS — F98.8 ATTENTION DEFICIT DISORDER, UNSPECIFIED TYPE: ICD-10-CM

## 2025-04-28 RX ORDER — LISDEXAMFETAMINE DIMESYLATE 30 MG/1
30 CAPSULE ORAL DAILY
Qty: 30 CAPSULE | Refills: 0 | OUTPATIENT
Start: 2025-04-28 | End: 2025-05-28

## 2025-04-28 RX ORDER — LISDEXAMFETAMINE DIMESYLATE 30 MG/1
30 CAPSULE ORAL DAILY
Qty: 30 CAPSULE | Refills: 0 | Status: SHIPPED | OUTPATIENT
Start: 2025-04-28 | End: 2025-05-28

## 2025-04-28 RX ORDER — LISDEXAMFETAMINE DIMESYLATE 30 MG/1
30 CAPSULE ORAL DAILY
Qty: 30 CAPSULE | Refills: 0 | Status: SHIPPED | OUTPATIENT
Start: 2025-05-29 | End: 2025-06-28

## 2025-04-28 RX ORDER — LISDEXAMFETAMINE DIMESYLATE 30 MG/1
30 CAPSULE ORAL DAILY
Qty: 30 CAPSULE | Refills: 0 | Status: SHIPPED | OUTPATIENT
Start: 2025-06-29 | End: 2025-07-29

## 2025-04-28 NOTE — TELEPHONE ENCOUNTER
Requesting Vyvanse 30mg  Last OV: 1/14/25 Telemedicine  RTC: not noted  Last Rx'd 3/16/25 #30 with 0 refills    Future Appointments   Date Time Provider Department Center   6/4/2025  9:40 AM Isrrael Burton MD [facility] [address]       [place] , last dispensed 3/16/25 #30    Controlled med:  Rx pended and routed for approval/denial

## 2025-06-04 ENCOUNTER — OFFICE VISIT (OUTPATIENT)
Dept: FAMILY MEDICINE CLINIC | Facility: CLINIC | Age: 45
End: 2025-06-04
Payer: COMMERCIAL

## 2025-06-04 VITALS
HEART RATE: 68 BPM | TEMPERATURE: 96 F | RESPIRATION RATE: 14 BRPM | HEIGHT: 70 IN | OXYGEN SATURATION: 98 % | SYSTOLIC BLOOD PRESSURE: 138 MMHG | DIASTOLIC BLOOD PRESSURE: 102 MMHG | BODY MASS INDEX: 36.22 KG/M2 | WEIGHT: 253 LBS

## 2025-06-04 DIAGNOSIS — Z00.00 ROUTINE GENERAL MEDICAL EXAMINATION AT A HEALTH CARE FACILITY: Primary | ICD-10-CM

## 2025-06-04 DIAGNOSIS — E55.9 VITAMIN D DEFICIENCY: ICD-10-CM

## 2025-06-04 DIAGNOSIS — L73.9 FOLLICULITIS: ICD-10-CM

## 2025-06-04 DIAGNOSIS — F98.8 ATTENTION DEFICIT DISORDER, UNSPECIFIED TYPE: ICD-10-CM

## 2025-06-04 DIAGNOSIS — R03.0 ELEVATED BLOOD PRESSURE READING: ICD-10-CM

## 2025-06-04 PROCEDURE — 3080F DIAST BP >= 90 MM HG: CPT | Performed by: FAMILY MEDICINE

## 2025-06-04 PROCEDURE — 99396 PREV VISIT EST AGE 40-64: CPT | Performed by: FAMILY MEDICINE

## 2025-06-04 PROCEDURE — 3075F SYST BP GE 130 - 139MM HG: CPT | Performed by: FAMILY MEDICINE

## 2025-06-04 PROCEDURE — 3008F BODY MASS INDEX DOCD: CPT | Performed by: FAMILY MEDICINE

## 2025-06-04 RX ORDER — DUPILUMAB 300 MG/2ML
INJECTION, SOLUTION SUBCUTANEOUS
COMMUNITY
Start: 2025-05-22

## 2025-06-04 RX ORDER — LISDEXAMFETAMINE DIMESYLATE 30 MG/1
30 CAPSULE ORAL DAILY
Qty: 30 CAPSULE | Refills: 0 | Status: CANCELLED | OUTPATIENT
Start: 2025-06-29 | End: 2025-07-29

## 2025-06-04 RX ORDER — LISDEXAMFETAMINE DIMESYLATE 30 MG/1
30 CAPSULE ORAL DAILY
Qty: 30 CAPSULE | Refills: 0 | Status: SHIPPED | OUTPATIENT
Start: 2025-08-05 | End: 2025-09-04

## 2025-06-04 RX ORDER — LISDEXAMFETAMINE DIMESYLATE 30 MG/1
30 CAPSULE ORAL DAILY
Qty: 30 CAPSULE | Refills: 0 | Status: SHIPPED | OUTPATIENT
Start: 2025-06-04 | End: 2025-07-04

## 2025-06-04 RX ORDER — LISDEXAMFETAMINE DIMESYLATE 30 MG/1
30 CAPSULE ORAL DAILY
Qty: 30 CAPSULE | Refills: 0 | Status: SHIPPED | OUTPATIENT
Start: 2025-07-05 | End: 2025-08-04

## 2025-06-04 RX ORDER — LISDEXAMFETAMINE DIMESYLATE 30 MG/1
30 CAPSULE ORAL DAILY
Qty: 30 CAPSULE | Refills: 0 | Status: CANCELLED | OUTPATIENT
Start: 2025-06-04 | End: 2025-07-04

## 2025-06-06 NOTE — PROGRESS NOTES
The following individual(s) verbally consented to be recorded using ambient AI listening technology and understand that they can each withdraw their consent to this listening technology at any point by asking the clinician to turn off or pause the recording:    Patient name: Denny Torres  Additional names:  no  Subjective:   Denny Torres is a 44 year old male who presents for Physical (Labs ordered)       History/Other:   History of Present Illness  Denny Torres is a 44 year old male who presents with a swollen and painful lesion on his face.    He has developed a swollen and painful lesion on his face, attributed to folliculitis from a cut. The lesion has been present for approximately three days and shows signs of pus. He has been soaking the area to alleviate swelling and discharge.    He experiences dizziness and fatigue, noting that even with Vyvanse, he feels drained and could fall asleep easily. He is on Vyvanse 30 mg and is due for a refill. He also uses Zoloft 100 mg twice a day and Dupixent for five weeks, which he believes has started to improve his symptoms.    His weight has increased from 247 lbs in September to 253 lbs currently, with a BMI of 36. He has a history of CPAP use related to sleep issues. He describes waking up with nasal congestion, initially thought to be due to allergies, but has improved after changing mattresses.   Chief Complaint Reviewed and Verified  Nursing Notes Reviewed and   Verified  Tobacco Reviewed  Allergies Reviewed  Medications Reviewed    Problem List Reviewed  Medical History Reviewed  Surgical History   Reviewed  Family History Reviewed  Social History Reviewed         Tobacco:  He has never smoked tobacco.    Current Medications[1]    PHQ-2 SCORE: 0  , done 6/4/2025   Last [facility] Suicide Screening on 6/4/2025 was No Risk.       Review of Systems:  Pertinent items are noted in HPI.  A comprehensive review of systems was negative.      Objective:    BP (!) 138/102   Pulse 68   Temp (!) 96 °F (35.6 °C) (Temporal)   Resp 14   Ht 5' 10\" (1.778 m)   Wt 253 lb (114.8 kg)   SpO2 98%   BMI 36.30 kg/m²  Estimated body mass index is 36.3 kg/m² as calculated from the following:    Height as of this encounter: 5' 10\" (1.778 m).    Weight as of this encounter: 253 lb (114.8 kg).  Results         Physical Exam  VITALS: BP- 122/92  MEASUREMENTS: Weight- 253, BMI- 36.0.  GENERAL: Alert, cooperative, well developed, no acute distress.  Skin: Mild swelling noted to the left cheek and left anterior cervical nodes, a 3 mm skin lesion with dry skin noted over the left upper lip.  HEENT: Normocephalic, normal oropharynx, moist mucous membranes.  CHEST: Clear to auscultation bilaterally. No wheezes, rhonchi, or crackles.  CARDIOVASCULAR: Normal heart rate and rhythm. S1 and S2 normal without murmurs.  ABDOMEN: Soft, non-tender, non-distended, without organomegaly. Normal bowel sounds.  EXTREMITIES: No cyanosis or edema.  NEUROLOGICAL: Cranial nerves grossly intact. Moves all extremities without gross motor or sensory deficit.  SKIN: Skin base slightly swollen.      Assessment & Plan:   1. Routine general medical examination at a health care facility (Primary)  -     Lipid Panel  -     Comp Metabolic Panel (14)  -     CBC With Differential With Platelet  2. Attention deficit disorder, unspecified type  -     Lisdexamfetamine Dimesylate; Take 1 capsule (30 mg total) by mouth daily.  Dispense: 30 capsule; Refill: 0  -     Lisdexamfetamine Dimesylate; Take 1 capsule (30 mg total) by mouth daily.  Dispense: 30 capsule; Refill: 0  -     Lisdexamfetamine Dimesylate; Take 1 capsule (30 mg total) by mouth daily.  Dispense: 30 capsule; Refill: 0  3. Vitamin D deficiency  -     Vitamin D, 25-Hydroxy  4. Elevated blood pressure reading  5. Folliculitis  Other orders  -     Amoxicillin-Pot Clavulanate; Take 1 tablet by mouth 2 (two) times daily for 10 days. TAKE WITH FOOD  Dispense: 20  tablet; Refill: 0    Assessment & Plan  Folliculitis  Folliculitis developed from a cut, presenting with swelling, pain, and pus formation over three days. Augmentin was chosen for its broad-spectrum coverage.  - Prescribe Augmentin 875 mg, one tablet twice a day with food for 10 days  - Advise soaking the lesion to aid in drainage    Hypertension  Blood pressure is borderline with diastolic pressure at 92 mmHg and systolic pressure at 122 mmHg. Weight gain has contributed to elevated blood pressure. Weight reduction is advised.  - Advise weight reduction    Obesity  Current weight is 253 lbs, BMI 36, increased from 247 lbs in September and 241 lbs in April last year. Dietary modifications are recommended.  - Advise weight reduction  - Discuss dietary modifications, including reducing intake of simple carbohydrates    ADHD  Managed with Vyvanse 30 mg. He reports being low on medication. A refill is necessary.  - Refill Vyvanse for a 3-month supply    Depression  Managed with Zoloft 100 mg twice a day. No issues reported.    Atopic Dermatitis  On Dupixent for 5 weeks with symptom improvement, including reduced nasal congestion and improved sleep.    General Health Maintenance  Routine blood work is due. Previous blood work was done in March 2024.  - Order blood work including CBC, CMP, lipid panel, thyroid function tests, and vitamin D levels    Follow-up  Schedule follow-up in early December for medication refill and assessment.  - Schedule follow-up appointment in early December        No follow-ups on file.        Isrrael Burton MD, 6/6/2025, 9:43 AM                 [1]   Current Outpatient Medications   Medication Sig Dispense Refill    DUPIXENT 300 MG/2ML Subcutaneous Solution Auto-injector       amoxicillin clavulanate 875-125 MG Oral Tab Take 1 tablet by mouth 2 (two) times daily for 10 days. TAKE WITH FOOD 20 tablet 0    lisdexamfetamine (VYVANSE) 30 MG Oral Cap Take 1 capsule (30 mg total) by mouth daily.  30 capsule 0    [START ON 7/5/2025] lisdexamfetamine (VYVANSE) 30 MG Oral Cap Take 1 capsule (30 mg total) by mouth daily. 30 capsule 0    [START ON 8/5/2025] lisdexamfetamine (VYVANSE) 30 MG Oral Cap Take 1 capsule (30 mg total) by mouth daily. 30 capsule 0    lisdexamfetamine (VYVANSE) 30 MG Oral Cap Take 1 capsule (30 mg total) by mouth daily. 30 capsule 0    [START ON 6/29/2025] lisdexamfetamine (VYVANSE) 30 MG Oral Cap Take 1 capsule (30 mg total) by mouth daily. 30 capsule 0    SERTRALINE 100 MG Oral Tab Take 1 tablet (100 mg total) by mouth 2 (two) times daily. 180 tablet 0    metoprolol succinate 25 MG Oral Tablet 24 Hr Take 1 tablet (25 mg total) by mouth daily.      Pantoprazole Sodium 40 MG Oral Tab EC Take 1 tablet (40 mg total) by mouth in the morning and 1 tablet (40 mg total) before bedtime. Take 1 @ Lunch time, Take 1 @ dinner.      Multiple Vitamin (MULTI-VITAMIN DAILY OR) Take  by mouth.

## 2025-07-23 RX ORDER — METOPROLOL SUCCINATE 50 MG/1
50 TABLET, EXTENDED RELEASE ORAL 2 TIMES DAILY
COMMUNITY
Start: 2025-06-15

## 2025-07-23 RX ORDER — SERTRALINE HYDROCHLORIDE 100 MG/1
100 TABLET, FILM COATED ORAL 2 TIMES DAILY
Qty: 180 TABLET | Refills: 0 | Status: SHIPPED | OUTPATIENT
Start: 2025-07-23

## (undated) DIAGNOSIS — Z20.822 CONTACT WITH AND (SUSPECTED) EXPOSURE TO COVID-19: Primary | ICD-10-CM

## (undated) NOTE — MR AVS SNAPSHOT
[facility] [address] [place] [address] [address] [facility]  [phone]               Thank you for choosing us for your health care visit with Tonie Romero MD.  We are glad to serve you and happy to provide you wit What changed:  Another medication with the same name was added. Make sure you understand how and when to take each. Commonly known as:  VYVANSE           * Lisdexamfetamine Dimesylate 70 MG Caps   Take 1 capsule (70 mg total) by mouth daily.    What ruiz your doctor or other care provider to review them with you.          Where to Get Your Medications      These medications were sent to . [facility] [address] [place] [address] - [facility] [address]  [phone], [phone], [place]

## (undated) NOTE — MR AVS SNAPSHOT
[facility] [address] [place] [address] [address] [facility]  [phone]               Thank you for choosing us for your health care visit with Charmaine Nguyen MD.  We are glad to serve you and happy to provide you wit * Lisdexamfetamine Dimesylate 30 MG Caps   Take 1 capsule (30 mg total) by mouth daily. What changed: You were already taking a medication with the same name, and this prescription was added. Make sure you understand how and when to take each. office, you can view your past visit information in [employer] by going to Visits < Visit Summaries. [employer] questions? Call [phone] for help. [employer] is NOT to be used for urgent needs. For medical emergencies, dial 911.         Educational Inform

## (undated) NOTE — LETTER
11/27/17        [address] [facility] [address]      Dear Bettyjane Dakin,    [facility] records indicate that you have outstanding lab work and or testing that was ordered for you and has not yet been completed:          CBC [0148] [Q]